# Patient Record
Sex: MALE | Race: ASIAN | NOT HISPANIC OR LATINO
[De-identification: names, ages, dates, MRNs, and addresses within clinical notes are randomized per-mention and may not be internally consistent; named-entity substitution may affect disease eponyms.]

---

## 2017-12-04 ENCOUNTER — APPOINTMENT (OUTPATIENT)
Dept: OTHER | Facility: CLINIC | Age: 47
End: 2017-12-04
Payer: COMMERCIAL

## 2017-12-04 VITALS
HEART RATE: 71 BPM | OXYGEN SATURATION: 98 % | HEIGHT: 68 IN | RESPIRATION RATE: 14 BRPM | SYSTOLIC BLOOD PRESSURE: 124 MMHG | WEIGHT: 199 LBS | BODY MASS INDEX: 30.16 KG/M2 | DIASTOLIC BLOOD PRESSURE: 83 MMHG

## 2017-12-04 PROCEDURE — 94010 BREATHING CAPACITY TEST: CPT

## 2017-12-04 PROCEDURE — 96150: CPT

## 2017-12-04 PROCEDURE — 99396 PREV VISIT EST AGE 40-64: CPT

## 2017-12-05 LAB
ALBUMIN SERPL ELPH-MCNC: 4.5 G/DL
ALP BLD-CCNC: 56 U/L
ALT SERPL-CCNC: 23 U/L
ANION GAP SERPL CALC-SCNC: 19 MMOL/L
APPEARANCE: CLEAR
AST SERPL-CCNC: 25 U/L
BASOPHILS # BLD AUTO: 0.02 K/UL
BASOPHILS NFR BLD AUTO: 0.3 %
BILIRUB SERPL-MCNC: 0.6 MG/DL
BILIRUBIN URINE: NEGATIVE
BLOOD URINE: NEGATIVE
BUN SERPL-MCNC: 14 MG/DL
CALCIUM SERPL-MCNC: 9.5 MG/DL
CHLORIDE SERPL-SCNC: 104 MMOL/L
CHOLEST SERPL-MCNC: 150 MG/DL
CHOLEST/HDLC SERPL: 4.7 RATIO
CO2 SERPL-SCNC: 22 MMOL/L
COLOR: YELLOW
CREAT SERPL-MCNC: 1.02 MG/DL
EOSINOPHIL # BLD AUTO: 0.04 K/UL
EOSINOPHIL NFR BLD AUTO: 0.5 %
GLUCOSE QUALITATIVE U: NEGATIVE MG/DL
GLUCOSE SERPL-MCNC: 97 MG/DL
HCT VFR BLD CALC: 39.2 %
HDLC SERPL-MCNC: 32 MG/DL
HGB BLD-MCNC: 14.2 G/DL
IMM GRANULOCYTES NFR BLD AUTO: 0.3 %
KETONES URINE: NEGATIVE
LDLC SERPL CALC-MCNC: 83 MG/DL
LEUKOCYTE ESTERASE URINE: NEGATIVE
LYMPHOCYTES # BLD AUTO: 2.34 K/UL
LYMPHOCYTES NFR BLD AUTO: 30.7 %
MAN DIFF?: NORMAL
MCHC RBC-ENTMCNC: 32.2 PG
MCHC RBC-ENTMCNC: 36.2 GM/DL
MCV RBC AUTO: 88.9 FL
MONOCYTES # BLD AUTO: 0.56 K/UL
MONOCYTES NFR BLD AUTO: 7.3 %
NEUTROPHILS # BLD AUTO: 4.64 K/UL
NEUTROPHILS NFR BLD AUTO: 60.9 %
NITRITE URINE: NEGATIVE
PH URINE: 6.5
PLATELET # BLD AUTO: 124 K/UL
POTASSIUM SERPL-SCNC: 4.6 MMOL/L
PROT SERPL-MCNC: 7.2 G/DL
PROTEIN URINE: NEGATIVE MG/DL
RBC # BLD: 4.41 M/UL
RBC # FLD: 13.5 %
SODIUM SERPL-SCNC: 145 MMOL/L
SPECIFIC GRAVITY URINE: 1.02
TRIGL SERPL-MCNC: 174 MG/DL
UROBILINOGEN URINE: NEGATIVE MG/DL
WBC # FLD AUTO: 7.62 K/UL

## 2019-03-12 ENCOUNTER — APPOINTMENT (OUTPATIENT)
Dept: OTHER | Facility: CLINIC | Age: 49
End: 2019-03-12
Payer: COMMERCIAL

## 2019-03-12 VITALS
SYSTOLIC BLOOD PRESSURE: 112 MMHG | DIASTOLIC BLOOD PRESSURE: 75 MMHG | RESPIRATION RATE: 16 BRPM | HEIGHT: 68 IN | BODY MASS INDEX: 30.31 KG/M2 | HEART RATE: 63 BPM | OXYGEN SATURATION: 99 % | WEIGHT: 200 LBS

## 2019-03-12 PROCEDURE — 94010 BREATHING CAPACITY TEST: CPT

## 2019-03-12 PROCEDURE — 99396 PREV VISIT EST AGE 40-64: CPT | Mod: 25

## 2019-03-12 PROCEDURE — 96150: CPT

## 2019-03-12 NOTE — DISCUSSION/SUMMARY
[Patient seen for WTC Monitoring ___] : Patient was seen for WTC monitoring [unfilled] [Please See Note in Chart and Documentation in Trial DB] : Please see note in chart and documentation in Trial DB. [FreeTextEntry3] : HPI: 49 yo male here for V7\par \par also snoring, interrupted sleep , EDS \par stated that was diagnosed with CLARA tried to use  CPAP 2 months \par did not like it\par PCP: Dr. 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 Similar to previous\par \par A/P: CBC, CMP, lipids, UA ordered \par  rec to loose weight \par CLARA discussed \par CPAP discussed \par GI referral fro screening colonoscopy

## 2019-03-13 LAB
ALBUMIN SERPL ELPH-MCNC: 4.6 G/DL
ALP BLD-CCNC: 64 U/L
ALT SERPL-CCNC: 37 U/L
ANION GAP SERPL CALC-SCNC: 13 MMOL/L
APPEARANCE: ABNORMAL
AST SERPL-CCNC: 31 U/L
BACTERIA: NEGATIVE
BASOPHILS # BLD AUTO: 0.03 K/UL
BASOPHILS NFR BLD AUTO: 0.5 %
BILIRUB SERPL-MCNC: 0.7 MG/DL
BILIRUBIN URINE: NEGATIVE
BLOOD URINE: NEGATIVE
BUN SERPL-MCNC: 16 MG/DL
CALCIUM SERPL-MCNC: 9.4 MG/DL
CHLORIDE SERPL-SCNC: 108 MMOL/L
CHOLEST SERPL-MCNC: 128 MG/DL
CHOLEST/HDLC SERPL: 4.6 RATIO
CO2 SERPL-SCNC: 24 MMOL/L
COLOR: YELLOW
CREAT SERPL-MCNC: 0.94 MG/DL
EOSINOPHIL # BLD AUTO: 0.11 K/UL
EOSINOPHIL NFR BLD AUTO: 1.8 %
GLUCOSE QUALITATIVE U: NEGATIVE
GLUCOSE SERPL-MCNC: 99 MG/DL
HCT VFR BLD CALC: 41.9 %
HDLC SERPL-MCNC: 28 MG/DL
HGB BLD-MCNC: 14.3 G/DL
HYALINE CASTS: 1 /LPF
IMM GRANULOCYTES NFR BLD AUTO: 0.3 %
KETONES URINE: NEGATIVE
LDLC SERPL CALC-MCNC: 67 MG/DL
LEUKOCYTE ESTERASE URINE: NEGATIVE
LYMPHOCYTES # BLD AUTO: 2.48 K/UL
LYMPHOCYTES NFR BLD AUTO: 41.3 %
MAN DIFF?: NORMAL
MCHC RBC-ENTMCNC: 31.7 PG
MCHC RBC-ENTMCNC: 34.1 GM/DL
MCV RBC AUTO: 92.9 FL
MICROSCOPIC-UA: NORMAL
MONOCYTES # BLD AUTO: 0.59 K/UL
MONOCYTES NFR BLD AUTO: 9.8 %
NEUTROPHILS # BLD AUTO: 2.78 K/UL
NEUTROPHILS NFR BLD AUTO: 46.3 %
NITRITE URINE: NEGATIVE
PH URINE: 6
PLATELET # BLD AUTO: 129 K/UL
POTASSIUM SERPL-SCNC: 4.4 MMOL/L
PROT SERPL-MCNC: 6.9 G/DL
PROTEIN URINE: NEGATIVE
RBC # BLD: 4.51 M/UL
RBC # FLD: 13.1 %
RED BLOOD CELLS URINE: 1 /HPF
SODIUM SERPL-SCNC: 144 MMOL/L
SPECIFIC GRAVITY URINE: 1.02
SQUAMOUS EPITHELIAL CELLS: 0 /HPF
TRIGL SERPL-MCNC: 167 MG/DL
UROBILINOGEN URINE: NORMAL
WBC # FLD AUTO: 6.01 K/UL
WHITE BLOOD CELLS URINE: 0 /HPF

## 2019-08-02 ENCOUNTER — APPOINTMENT (OUTPATIENT)
Dept: GASTROENTEROLOGY | Facility: CLINIC | Age: 49
End: 2019-08-02
Payer: COMMERCIAL

## 2019-08-02 VITALS
RESPIRATION RATE: 16 BRPM | TEMPERATURE: 97.5 F | HEIGHT: 68 IN | DIASTOLIC BLOOD PRESSURE: 62 MMHG | WEIGHT: 185 LBS | SYSTOLIC BLOOD PRESSURE: 120 MMHG | HEART RATE: 48 BPM | OXYGEN SATURATION: 98 % | BODY MASS INDEX: 28.04 KG/M2

## 2019-08-02 DIAGNOSIS — Z87.19 PERSONAL HISTORY OF OTHER DISEASES OF THE DIGESTIVE SYSTEM: ICD-10-CM

## 2019-08-02 DIAGNOSIS — D69.6 THROMBOCYTOPENIA, UNSPECIFIED: ICD-10-CM

## 2019-08-02 DIAGNOSIS — Z12.11 ENCOUNTER FOR SCREENING FOR MALIGNANT NEOPLASM OF COLON: ICD-10-CM

## 2019-08-02 PROCEDURE — 99204 OFFICE O/P NEW MOD 45 MIN: CPT

## 2019-08-02 NOTE — PHYSICAL EXAM
[General Appearance - Alert] : alert [Sclera] : the sclera and conjunctiva were normal [General Appearance - In No Acute Distress] : in no acute distress [PERRL With Normal Accommodation] : pupils were equal in size, round, and reactive to light [Extraocular Movements] : extraocular movements were intact [Outer Ear] : the ears and nose were normal in appearance [Oropharynx] : the oropharynx was normal [Neck Cervical Mass (___cm)] : no neck mass was observed [Neck Appearance] : the appearance of the neck was normal [Jugular Venous Distention Increased] : there was no jugular-venous distention [Thyroid Diffuse Enlargement] : the thyroid was not enlarged [Thyroid Nodule] : there were no palpable thyroid nodules [Auscultation Breath Sounds / Voice Sounds] : lungs were clear to auscultation bilaterally [Heart Rate And Rhythm] : heart rate was normal and rhythm regular [Heart Sounds] : normal S1 and S2 [Murmurs] : no murmurs [Heart Sounds Gallop] : no gallops [Edema] : there was no peripheral edema [Heart Sounds Pericardial Friction Rub] : no pericardial rub [Bowel Sounds] : normal bowel sounds [Abdomen Soft] : soft [Abdomen Tenderness] : non-tender [Cervical Lymph Nodes Enlarged Posterior Bilaterally] : posterior cervical [Abdomen Mass (___ Cm)] : no abdominal mass palpated [Cervical Lymph Nodes Enlarged Anterior Bilaterally] : anterior cervical [Supraclavicular Lymph Nodes Enlarged Bilaterally] : supraclavicular [Axillary Lymph Nodes Enlarged Bilaterally] : axillary [Inguinal Lymph Nodes Enlarged Bilaterally] : inguinal [Femoral Lymph Nodes Enlarged Bilaterally] : femoral [No CVA Tenderness] : no ~M costovertebral angle tenderness [No Spinal Tenderness] : no spinal tenderness [Abnormal Walk] : normal gait [Nail Clubbing] : no clubbing  or cyanosis of the fingernails [Musculoskeletal - Swelling] : no joint swelling seen [Skin Color & Pigmentation] : normal skin color and pigmentation [Motor Tone] : muscle strength and tone were normal [Skin Turgor] : normal skin turgor [Oriented To Time, Place, And Person] : oriented to person, place, and time [Impaired Insight] : insight and judgment were intact [] : no rash [Affect] : the affect was normal

## 2019-08-02 NOTE — ASSESSMENT
[FreeTextEntry1] : 48-year-old male average risk for colon cancer and polyps\par \par Plan\par Patient educated regarding the indications risks benefits and alternatives to colonoscopy as a tool for colon cancer screening and prevention purposes. He is agreeable to examination.\par \par Patient referred by Dr. Natasha Arroyo

## 2019-08-02 NOTE — HISTORY OF PRESENT ILLNESS
[de-identified] : 48-year-old male presents for evaluation prior to screening colonoscopy.\par Denies any history of prior examination\par No family history of colon cancer\par Denies any active bowel complaints.\par Patient did note that recently on "keto" diet he developed constipation, resolved when he resume normal diet\par Also with a history of acid reflux, no longer an issue since weight loss\par Sometimes exacerbated by specific foods such as tomato sauce\par Denies any difficulty swallowing\par \par Denies any history of heart failure coronary artery disease or dysrhythmia\par Sleep apnea noncompliant with CPAP\par \par Social history nonsmoker [employer]

## 2019-09-18 ENCOUNTER — APPOINTMENT (OUTPATIENT)
Dept: GASTROENTEROLOGY | Facility: AMBULATORY MEDICAL SERVICES | Age: 49
End: 2019-09-18
Payer: COMMERCIAL

## 2019-09-18 PROCEDURE — 45378 DIAGNOSTIC COLONOSCOPY: CPT

## 2019-09-24 ENCOUNTER — TRANSCRIPTION ENCOUNTER (OUTPATIENT)
Age: 49
End: 2019-09-24

## 2020-04-08 ENCOUNTER — EMERGENCY (EMERGENCY)
Facility: HOSPITAL | Age: 50
LOS: 1 days | Discharge: HOME | End: 2020-04-08
Attending: EMERGENCY MEDICINE
Payer: COMMERCIAL

## 2020-04-08 VITALS
OXYGEN SATURATION: 93 % | RESPIRATION RATE: 20 BRPM | HEIGHT: 69 IN | DIASTOLIC BLOOD PRESSURE: 61 MMHG | WEIGHT: 186.95 LBS | SYSTOLIC BLOOD PRESSURE: 90 MMHG | HEART RATE: 88 BPM | TEMPERATURE: 103 F

## 2020-04-08 LAB
ALBUMIN SERPL ELPH-MCNC: 3.8 G/DL — SIGNIFICANT CHANGE UP (ref 3.3–5)
ALP SERPL-CCNC: 40 U/L — SIGNIFICANT CHANGE UP (ref 40–120)
ALT FLD-CCNC: 28 U/L — SIGNIFICANT CHANGE UP (ref 10–45)
ANION GAP SERPL CALC-SCNC: 13 MMOL/L — SIGNIFICANT CHANGE UP (ref 5–17)
AST SERPL-CCNC: 48 U/L — HIGH (ref 10–40)
BASE EXCESS BLDV CALC-SCNC: 0.3 MMOL/L — SIGNIFICANT CHANGE UP (ref -2–2)
BILIRUB SERPL-MCNC: 0.9 MG/DL — SIGNIFICANT CHANGE UP (ref 0.2–1.2)
BUN SERPL-MCNC: 16 MG/DL — SIGNIFICANT CHANGE UP (ref 7–23)
CA-I SERPL-SCNC: 1.13 MMOL/L — SIGNIFICANT CHANGE UP (ref 1.12–1.3)
CALCIUM SERPL-MCNC: 8.4 MG/DL — SIGNIFICANT CHANGE UP (ref 8.4–10.5)
CHLORIDE BLDV-SCNC: 100 MMOL/L — SIGNIFICANT CHANGE UP (ref 96–108)
CHLORIDE SERPL-SCNC: 97 MMOL/L — SIGNIFICANT CHANGE UP (ref 96–108)
CO2 BLDV-SCNC: 24 MMOL/L — SIGNIFICANT CHANGE UP (ref 22–30)
CO2 SERPL-SCNC: 21 MMOL/L — LOW (ref 22–31)
CREAT SERPL-MCNC: 1.11 MG/DL — SIGNIFICANT CHANGE UP (ref 0.5–1.3)
D DIMER BLD IA.RAPID-MCNC: 312 NG/ML DDU — HIGH
FIBRINOGEN PPP-MCNC: 618 MG/DL — HIGH (ref 350–510)
GAS PNL BLDV: 131 MMOL/L — LOW (ref 135–145)
GAS PNL BLDV: SIGNIFICANT CHANGE UP
GAS PNL BLDV: SIGNIFICANT CHANGE UP
GLUCOSE BLDV-MCNC: 118 MG/DL — HIGH (ref 70–99)
GLUCOSE SERPL-MCNC: 124 MG/DL — HIGH (ref 70–99)
HCO3 BLDV-SCNC: 23 MMOL/L — SIGNIFICANT CHANGE UP (ref 21–29)
HCT VFR BLDA CALC: 44 % — SIGNIFICANT CHANGE UP (ref 39–50)
HGB BLD CALC-MCNC: 14.3 G/DL — SIGNIFICANT CHANGE UP (ref 13–17)
LACTATE BLDV-MCNC: 1.2 MMOL/L — SIGNIFICANT CHANGE UP (ref 0.7–2)
LDH SERPL L TO P-CCNC: 457 U/L — HIGH (ref 50–242)
OTHER CELLS CSF MANUAL: 14 ML/DL — LOW (ref 18–22)
PCO2 BLDV: 35 MMHG — SIGNIFICANT CHANGE UP (ref 35–50)
PH BLDV: 7.44 — SIGNIFICANT CHANGE UP (ref 7.35–7.45)
PO2 BLDV: 36 MMHG — SIGNIFICANT CHANGE UP (ref 25–45)
POTASSIUM BLDV-SCNC: 3.8 MMOL/L — SIGNIFICANT CHANGE UP (ref 3.5–5.3)
POTASSIUM SERPL-MCNC: 3.9 MMOL/L — SIGNIFICANT CHANGE UP (ref 3.5–5.3)
POTASSIUM SERPL-SCNC: 3.9 MMOL/L — SIGNIFICANT CHANGE UP (ref 3.5–5.3)
PROCALCITONIN SERPL-MCNC: 0.1 NG/ML — SIGNIFICANT CHANGE UP (ref 0.02–0.1)
PROT SERPL-MCNC: 6.9 G/DL — SIGNIFICANT CHANGE UP (ref 6–8.3)
SAO2 % BLDV: 69 % — SIGNIFICANT CHANGE UP (ref 67–88)
SODIUM SERPL-SCNC: 131 MMOL/L — LOW (ref 135–145)
WBC # BLD: 1.8 K/UL — LOW (ref 3.8–10.5)
WBC # FLD AUTO: 1.8 K/UL — LOW (ref 3.8–10.5)

## 2020-04-08 PROCEDURE — 84295 ASSAY OF SERUM SODIUM: CPT

## 2020-04-08 PROCEDURE — 85014 HEMATOCRIT: CPT

## 2020-04-08 PROCEDURE — 84132 ASSAY OF SERUM POTASSIUM: CPT

## 2020-04-08 PROCEDURE — 82435 ASSAY OF BLOOD CHLORIDE: CPT

## 2020-04-08 PROCEDURE — 82565 ASSAY OF CREATININE: CPT

## 2020-04-08 PROCEDURE — 83605 ASSAY OF LACTIC ACID: CPT

## 2020-04-08 PROCEDURE — 82728 ASSAY OF FERRITIN: CPT

## 2020-04-08 PROCEDURE — 85027 COMPLETE CBC AUTOMATED: CPT

## 2020-04-08 PROCEDURE — 71045 X-RAY EXAM CHEST 1 VIEW: CPT | Mod: 26

## 2020-04-08 PROCEDURE — 93005 ELECTROCARDIOGRAM TRACING: CPT

## 2020-04-08 PROCEDURE — 82947 ASSAY GLUCOSE BLOOD QUANT: CPT

## 2020-04-08 PROCEDURE — 85060 BLOOD SMEAR INTERPRETATION: CPT

## 2020-04-08 PROCEDURE — 85384 FIBRINOGEN ACTIVITY: CPT

## 2020-04-08 PROCEDURE — 84145 PROCALCITONIN (PCT): CPT

## 2020-04-08 PROCEDURE — 71045 X-RAY EXAM CHEST 1 VIEW: CPT

## 2020-04-08 PROCEDURE — 85379 FIBRIN DEGRADATION QUANT: CPT

## 2020-04-08 PROCEDURE — 82803 BLOOD GASES ANY COMBINATION: CPT

## 2020-04-08 PROCEDURE — 86140 C-REACTIVE PROTEIN: CPT

## 2020-04-08 PROCEDURE — 82330 ASSAY OF CALCIUM: CPT

## 2020-04-08 PROCEDURE — 99285 EMERGENCY DEPT VISIT HI MDM: CPT

## 2020-04-08 PROCEDURE — 99283 EMERGENCY DEPT VISIT LOW MDM: CPT | Mod: 25

## 2020-04-08 PROCEDURE — 80053 COMPREHEN METABOLIC PANEL: CPT

## 2020-04-08 PROCEDURE — 83615 LACTATE (LD) (LDH) ENZYME: CPT

## 2020-04-08 RX ORDER — ACETAMINOPHEN 500 MG
975 TABLET ORAL ONCE
Refills: 0 | Status: COMPLETED | OUTPATIENT
Start: 2020-04-08 | End: 2020-04-08

## 2020-04-08 RX ADMIN — Medication 975 MILLIGRAM(S): at 23:09

## 2020-04-08 NOTE — ED PROVIDER NOTE - PROGRESS NOTE DETAILS
Ambulatory 02 sat 95%. Removed 2L NC, continues to sat at 95% on RA. Will continue to monitor.   Patricia Gutiérrez D.O. (PGY-1) Discussed strict return precautions, monitoring symptom progression at home, and pulse ox monitoring along with isolation instructions. Addressed all questions and concerns at this time. Pt verbalized understanding. Pt 02 sat 96% on RA. VS otherwise stable. Medically stable for discharge.   Patricia Gutiérrez D.O. (PGY-1)

## 2020-04-08 NOTE — ED PROVIDER NOTE - NSFOLLOWUPINSTRUCTIONS_ED_ALL_ED_FT
You were seen and evaluated in the Emergency Department for your viral upper respiratory-like, possibly COVID. As we are not testing patients for COVID that are stable for discharge, you should self-quarantine for presumed COVID. You can call back for your viral panel results by [phone]; please see the attached COVID information packet for management of your symptoms, and more information on the next steps including your self-quarantine measures. Remaining self-quarantined is crucial to limiting the spread of the virus.  You may also refer to the CDC website for more information: https://www.cdc.gov/coronavirus/2019-ncov/if-you-are-sick/steps-when-sick.html.      At this time your clinical evaluation and history do not demonstrate any acute, life-threatening medical conditions warranting emergent treatment. We strongly recommend you set up a follow up with your primary care doctor or with our Internal Medicine clinic (see contact information below).    [facility] Internal Medicine  General Internal Medicine  [address] [place] [address]  Phone: [phone]    Continue to maintain oral hydration with plenty of fluids.  You may take Tylenol (Acetaminophen) and Motrin (Ibuprofen), every 8 hours with food (following the instructions on the medication insert information sheet for dosing) for fever control.  Do not exceed 3000 mg in 24 hours of acetaminophen (Tylenol).     Should you develop new or worsening symptoms including but not limited to chest pain, shortness of breath, abdominal pain, fevers, vomiting, diarrhea - please return to the ED for immediate evaluation.

## 2020-04-08 NOTE — ED ADULT NURSE NOTE - NSIMPLEMENTINTERV_GEN_ALL_ED
Implemented All Universal Safety Interventions:  [place] to call system. Call bell, personal items and telephone within reach. Instruct patient to call for assistance. Room bathroom lighting operational. Non-slip footwear when patient is off stretcher. Physically safe environment: no spills, clutter or unnecessary equipment. Stretcher in lowest position, wheels locked, appropriate side rails in place.

## 2020-04-08 NOTE — ED PROVIDER NOTE - PHYSICAL EXAMINATION
Physical Exam:  Gen: NAD, non-toxic appearing, awake alert   Head: NCAT  HEENT: PEERL, normal conjunctiva, oral mucosa moist  Neck: No lymphadenopathy   Lung: CTAB, no respiratory distress, no wheezes/rhonchi/rales B/L, speaking in full sentences  CV: RRR, no murmurs, rubs or gallops  Abd: soft, NT, ND, no guarding, no rigidity, no rebound tenderness  MSK: no visible deformities, ROM normal in UE/LE, +mild pain to palpation of LLE, no leg swelling or erythema   Neuro: No focal sensory or motor deficits  Skin: Warm, well perfused, no rash  Psych: normal affect, calm  ~Patricia Gutiérrez D.O. -Resident

## 2020-04-08 NOTE — ED PROVIDER NOTE - CLINICAL SUMMARY MEDICAL DECISION MAKING FREE TEXT BOX
49y M w/ no PMHx presenting with 10d fever, myalgias, diarrhea, dry cough w/ associated sob. Likely COVID etiology, hypoxic to 79 on RA, requiring NC, will ambulate, covid w/u w/ CXR and EKG, likely admission for 02 requirement.

## 2020-04-08 NOTE — ED PROVIDER NOTE - ADDITIONAL NOTES AND INSTRUCTIONS:
Benjamin has been instructed to remain home, not to return to work for two weeks to monitor his symptoms and to return to the Emergency Department for any worsening symptoms.

## 2020-04-08 NOTE — ED PROVIDER NOTE - ATTENDING CONTRIBUTION TO CARE
I performed a history and physical exam of the patient and discussed their management with the resident/ACP. I reviewed the resident/ACP's note and agree with the documented findings and plan of care.   49M no PMHx, COVID like symptoms for 10 days, PE unremarkable, initially was reported to be hypoxic but rechecked multiple times with normal saturation even while ambulating. PE unremarkable except for fever; Impression: COVID, labs, imaging, if no evidence of hypoxia, re-eval and possibly D/C home. At the end of my shift this patient was signed out to the next attending pending all work up given he was seen less than 5 minutes prior to my shift ending.

## 2020-04-08 NOTE — ED PROVIDER NOTE - OBJECTIVE STATEMENT
49y M w/ no PMHx presenting with 10d fever, myalgias, diarrhea, dry cough w/ associated sob. Patient states while exerting himself, he endorses worsening sob as well as post-tussive sharp chest pain. Has been controlling fever and myalgias at home with Tylenol, last dose at 2pm. Also endorses nausea, but tolerating PO.  Patient endorses +COVID contact. Denies headache, vomiting, dysuria, hematuria, rash.

## 2020-04-08 NOTE — ED PROVIDER NOTE - PATIENT PORTAL LINK FT
You can access the FollowMyHealth Patient Portal offered by [facility] by registering at the following website: [email or web address]. By joining [facility]’s FollowMyHealth portal, you will also be able to view your health information using other applications (apps) compatible with our system.

## 2020-04-08 NOTE — ED PROVIDER NOTE - NS ED ROS FT
CONST: + fevers, +myalgias,  no chills  EYES: no pain, no vision changes  ENT: no sore throat, no ear pain, no change in hearing  CV: +post-tussive chest pain, no leg swelling  RESP: + shortness of breath, + cough  ABD: no abdominal pain, + nausea, no vomiting, + diarrhea  : no dysuria, no flank pain, no hematuria  MSK: no back pain, no extremity pain  NEURO: no headache or additional neurologic complaints  HEME: no easy bleeding  SKIN:  no rash

## 2020-04-08 NOTE — ED ADULT TRIAGE NOTE - CHIEF COMPLAINT QUOTE
day 10 fever, cough, diarrhea, ringing in ear, weakness; COVID tested but now results; pt is [employer] with exposure to COVID pt; decreased spo2 noted today

## 2020-04-08 NOTE — ED ADULT NURSE NOTE - OBJECTIVE STATEMENT
49 y.o M A&Ox3 with no sig PMH presents to the ED c.o fevers, chills, SOB and diarrhea. Pt. reports 11 days of fevers/chills. Developed SOB last night. Presents now with worsening SOB. Pt. placed on 2L NC - improved immediately to 96%. Pt. reports multiple episodes of non bloody diarrhea. Denies n/v, and ABD pain. Endorses pos. sick contacts. States he was tested for covid19 on 4/1 but has not received his results. Febrile to 103. Last took Tylenol at 1400. Denies syncopal episodes, recent travel, n/v and urinary symptoms. Safety and comfort maintained. ISO precautions in place.

## 2020-04-09 VITALS
OXYGEN SATURATION: 96 % | DIASTOLIC BLOOD PRESSURE: 56 MMHG | TEMPERATURE: 100 F | HEART RATE: 69 BPM | RESPIRATION RATE: 20 BRPM | SYSTOLIC BLOOD PRESSURE: 100 MMHG

## 2020-04-09 LAB
BASOPHILS # BLD AUTO: 0 K/UL — SIGNIFICANT CHANGE UP (ref 0–0.2)
BASOPHILS NFR BLD AUTO: 0 % — SIGNIFICANT CHANGE UP (ref 0–2)
CRP SERPL-MCNC: 1.88 MG/DL — HIGH (ref 0–0.4)
EOSINOPHIL # BLD AUTO: 0 K/UL — SIGNIFICANT CHANGE UP (ref 0–0.5)
EOSINOPHIL NFR BLD AUTO: 0 % — SIGNIFICANT CHANGE UP (ref 0–6)
FERRITIN SERPL-MCNC: 4998 NG/ML — HIGH (ref 30–400)
HCT VFR BLD CALC: 37.8 % — LOW (ref 39–50)
HGB BLD-MCNC: 13.7 G/DL — SIGNIFICANT CHANGE UP (ref 13–17)
LYMPHOCYTES # BLD AUTO: 0.46 K/UL — LOW (ref 1–3.3)
LYMPHOCYTES # BLD AUTO: 25.7 % — SIGNIFICANT CHANGE UP (ref 13–44)
MCHC RBC-ENTMCNC: 31 PG — SIGNIFICANT CHANGE UP (ref 27–34)
MCHC RBC-ENTMCNC: 36.2 GM/DL — HIGH (ref 32–36)
MCV RBC AUTO: 85.5 FL — SIGNIFICANT CHANGE UP (ref 80–100)
MONOCYTES # BLD AUTO: 0.33 K/UL — SIGNIFICANT CHANGE UP (ref 0–0.9)
MONOCYTES NFR BLD AUTO: 18.6 % — HIGH (ref 2–14)
NEUTROPHILS # BLD AUTO: 0.59 K/UL — LOW (ref 1.8–7.4)
NEUTROPHILS NFR BLD AUTO: 32.7 % — LOW (ref 43–77)
PLATELET # BLD AUTO: 94 K/UL — LOW (ref 150–400)
RBC # BLD: 4.42 M/UL — SIGNIFICANT CHANGE UP (ref 4.2–5.8)
RBC # FLD: 11 % — SIGNIFICANT CHANGE UP (ref 10.3–14.5)

## 2020-04-09 NOTE — ED POST DISCHARGE NOTE - ADDITIONAL DOCUMENTATION
Findings consistent with suspected COVID19 infection.  No need for further patient contact at this time.  -Dane Uribe PA-C Findings consistent with suspected COVID19 infection.  No need for further patient contact at this time.  -Dane Uribe PA-C    Findings of elevated CRP and elevated ferritin consistent with suspected COVID19 infection.  No need for further patient contact at this time. -Ez PATRICK

## 2020-04-09 NOTE — ED POST DISCHARGE NOTE - RESULT SUMMARY
lymphopenia and reactive lymphocytes lymphopenia and reactive lymphocytes, elevated CRP, elevated Ferritin

## 2020-04-20 ENCOUNTER — APPOINTMENT (OUTPATIENT)
Dept: OTHER | Facility: CLINIC | Age: 50
End: 2020-04-20

## 2020-05-07 ENCOUNTER — TRANSCRIPTION ENCOUNTER (OUTPATIENT)
Age: 50
End: 2020-05-07

## 2020-06-05 PROBLEM — Z78.9 OTHER SPECIFIED HEALTH STATUS: Chronic | Status: ACTIVE | Noted: 2020-04-08

## 2020-06-11 ENCOUNTER — APPOINTMENT (OUTPATIENT)
Dept: PULMONOLOGY | Facility: CLINIC | Age: 50
End: 2020-06-11

## 2020-07-30 DIAGNOSIS — Z01.812 ENCOUNTER FOR PREPROCEDURAL LABORATORY EXAMINATION: ICD-10-CM

## 2020-08-12 ENCOUNTER — APPOINTMENT (OUTPATIENT)
Dept: DISASTER EMERGENCY | Facility: CLINIC | Age: 50
End: 2020-08-12

## 2020-08-14 LAB — SARS-COV-2 N GENE NPH QL NAA+PROBE: NOT DETECTED

## 2020-08-17 ENCOUNTER — APPOINTMENT (OUTPATIENT)
Dept: PULMONOLOGY | Facility: CLINIC | Age: 50
End: 2020-08-17
Payer: COMMERCIAL

## 2020-08-17 VITALS
WEIGHT: 190 LBS | DIASTOLIC BLOOD PRESSURE: 70 MMHG | TEMPERATURE: 97.3 F | RESPIRATION RATE: 16 BRPM | HEIGHT: 68 IN | OXYGEN SATURATION: 98 % | HEART RATE: 60 BPM | SYSTOLIC BLOOD PRESSURE: 120 MMHG | BODY MASS INDEX: 28.79 KG/M2

## 2020-08-17 DIAGNOSIS — Z57.9 OCCUPATIONAL EXPOSURE TO UNSPECIFIED RISK FACTOR: ICD-10-CM

## 2020-08-17 DIAGNOSIS — Z82.69 FAMILY HISTORY OF OTHER DISEASES OF THE MUSCULOSKELETAL SYSTEM AND CONNECTIVE TISSUE: ICD-10-CM

## 2020-08-17 DIAGNOSIS — Z83.3 FAMILY HISTORY OF DIABETES MELLITUS: ICD-10-CM

## 2020-08-17 DIAGNOSIS — Z80.41 FAMILY HISTORY OF MALIGNANT NEOPLASM OF OVARY: ICD-10-CM

## 2020-08-17 DIAGNOSIS — Z86.19 PERSONAL HISTORY OF OTHER INFECTIOUS AND PARASITIC DISEASES: ICD-10-CM

## 2020-08-17 DIAGNOSIS — Z78.9 OTHER SPECIFIED HEALTH STATUS: ICD-10-CM

## 2020-08-17 PROCEDURE — 71046 X-RAY EXAM CHEST 2 VIEWS: CPT

## 2020-08-17 PROCEDURE — 94729 DIFFUSING CAPACITY: CPT

## 2020-08-17 PROCEDURE — 94010 BREATHING CAPACITY TEST: CPT

## 2020-08-17 PROCEDURE — 99204 OFFICE O/P NEW MOD 45 MIN: CPT | Mod: 25

## 2020-08-17 PROCEDURE — 94727 GAS DIL/WSHOT DETER LNG VOL: CPT

## 2020-08-17 PROCEDURE — 94618 PULMONARY STRESS TESTING: CPT

## 2020-08-17 SDOH — HEALTH STABILITY - PHYSICAL HEALTH: OCCUPATIONAL EXPOSURE TO UNSPECIFIED RISK FACTOR: Z57.9

## 2020-08-17 NOTE — PROCEDURE
[FreeTextEntry1] : CXR reveals a normal sized heart; old fx rib on the left,  no evidence of infiltrate or effusion\par \par CT (3/2008) impression: 3 MM NODULE in right middle lobe \par \par \par Full PFT revealed normal flows, with a FEV1 of 3.93L, which is 111% of predicted, normal volumes, and a diffusion of  25.6, which is 100% of predicted, with a normal flow volume loop\par \par 6 minute walk test reveals a low saturation of  98% with no evidence of dyspnea or fatigue; 586.8 walked  meters \par

## 2020-08-17 NOTE — PHYSICAL EXAM
[No Acute Distress] : no acute distress [Normal Oropharynx] : normal oropharynx [No Neck Mass] : no neck mass [Normal Appearance] : normal appearance [III] : Mallampati Class: III [Normal Rate/Rhythm] : normal rate/rhythm [Normal S1, S2] : normal s1, s2 [No Murmurs] : no murmurs [No Abnormalities] : no abnormalities [No Resp Distress] : no resp distress [Clear to Auscultation Bilaterally] : clear to auscultation bilaterally [Normal Gait] : normal gait [Benign] : benign [No Clubbing] : no clubbing [No Edema] : no edema [No Cyanosis] : no cyanosis [FROM] : FROM [No Focal Deficits] : no focal deficits [Normal Color/ Pigmentation] : normal color/ pigmentation [Normal Affect] : normal affect [Oriented x3] : oriented x3 [TextBox_68] : I:E ratio 1:3; clear

## 2020-08-17 NOTE — ASSESSMENT
[FreeTextEntry1] : Mr. GALLOWAY is a 49 year old male with a history of   s/p occupational exposure in work place s/p 9/11, thrombocytopenia, GERD, CLARA  who comes into the office today for pulmonary evaluation for SOB, CLARA, abnormal CT, occupational exposure in work place, s/p COVID-19 pneumonitis / respiratory failure \par \par \par The patient's shortness of breath is multifactorial due to:\par -pulmonary disease \par     - s/p COVID-19 pneumonitis\par     - RADS\par     - ?pleuropericarditis \par -poor breathing mechanics \par -overweight/out of shape\par -?cardiac disease \par \par \par \par Problem 1:  s/p COVID-19 pneumonitis\par - Get blood work: D- Dimer, ferritin level\par \par  COVID-19 Immune Support Recommendations:\par -OTC Vitamin C 500mg BID \par -OTC Quercetin 250-500mg BID \par -OTC Zinc 75-100mg per day \par -OTC Melatonin 1 or 2 mg a night \par -OTC Vitamin D 1-4000mg per day \par -OTC Tonic Water 8oz per day \par \par \par Problem 2: RADS \par - Add Symbicort (160) 2 inhalations BID\par -Add Ventolin rescue inhaler 2 inhalations before exercise, Q6H \par - get full PFTs \par -Inhaler technique reviewed as well as oral hygiene techniques reviewed with patient. Avoidance of cold air, extremes of temperature, rescue inhaler should be used before exercise. Order of medication reviewed with patient. Recommended use of a cool mist humidifier in the bedroom.\par \par  \par Problem 3: ?Pleuropericarditis\par - get work work: CRP and ESR \par \par \par Problem 4:   Occupational exposure at workplace s/p 9/11: \par - risk of asthma c/w COVID-19 pneumonitis\par - get regular follow ups \par \par \par \par Problem 5:GERD\par -Add Pepcid 40 mg QHS\par -Rule of 2s: avoid eating too much, eating too fast, eating too late, eating too spicy, eating too lousy, eating two hours before bed.\par -Things to avoid including overeating, spicy foods, tight clothing, eating within three hours of bed, this list is not all inclusive. \par -For treatment of reflux, possible options discussed including diet control, H2 blockers, PPIs, as well as coating motility agents discussed as treatment options. Timing of meals and proximity of last meal to sleep were discussed. If symptoms persist, a formal gastrointestinal evaluation is needed.\par \par \par  Problem 6: CLARA (neck size: 17, memory / concentration, metabolism)\par - get home sleep study to re-evaluate \par - recommended oral appliance \par Sleep apnea is associated with adverse clinical consequences which an affect most organ systems. Cardiovascular disease risk includes arrhythmias, atrial fibrillation, hypertension, coronary artery disease, and stroke. Metabolic disorders include diabetes type 2, non-alcoholic fatty liver disease. Mood disorder especially depression; and cognitive decline especially in the elderly. Associations with chronic reflux/Garcia’s esophagus some but not all inclusive. \par -Reasons include arousal consistent with hypopnea; respiratory events most prominent in REM sleep or supine position; therefore sleep staging and body position are important for accurate diagnosis and estimation of AHI.\par \par \par Problem 7: abnormal CT (3 mm nodule 3/2008) \par - get follow up CT scan\par CAT scans are the only radiological modality to identify abnormalities w/in the lungs with regards to nodules/masses/lymph nodes. Risks, benefits were reviewed in detail. The guidelines for abnormalities include follow up CT scans at various intervals which could range from 6 weeks to 1 year intervals. If there is a change for the worse then consideration for a biopsy will be considered if you are a candidate. Second opinion evaluation with a thoracic surgeon or an interventional radiologist could be offered. \par \par \par Problem 8 : Poor Mechanics of Breathing\par - Proper breathing techniques were reviewed with an emphasis of exhalation. Patient instructed to breath in for 1 second and out for four seconds. Patient was encouraged to not talk while walking. \par \par Problem 9 : Out of shape \par -Weight loss, exercise, and diet control were discussed and are highly encouraged. Treatment options were given such as, aqua therapy, and contacting a nutritionist. Recommended to use the elliptical, stationary bike, less use of treadmill. Mindful eating was explained to the patient Obesity is associated with worsening asthma, shortness of breath, and potential for cardiac disease, diabetes, and other underlying medical conditions. \par \par Problem 10 : Cardiac\par -recommended to follow up with a cardiologist\par \par Problem 11: health maintenance\par -s/p influenza vaccine\par -recommended strep pneumonia vaccines after age 65: Prevnar-13 vaccine, followed by Pneumo vaccine 23 one year following\par -recommended early intervention for URIs\par -recommended regular osteoporosis evaluations\par -recommended early dermatological evaluations\par -recommended after the age of 50 to the age of 70, colonoscopy every 5 years \par \par  Follow up in 6-8 weeks\par -he  is recommended to call with any changes, questions, or concerns.\par

## 2020-08-17 NOTE — REASON FOR VISIT
[Initial] : an initial visit [TextBox_44] : abnormal CT, s/p COVID-19 pneumonitis, CLARA, GERD, ?pleuropericarditis

## 2020-08-17 NOTE — ADDENDUM
[FreeTextEntry1] : Documented by Marlyn Fallon acting as a scribe for Dr. Sushant Samayoa on 08/17/2020.\par \par All medical record entries made by the Scribe were at my, Dr. Sushant Samayoa's, direction and personally dictated by me on 08/17/2020. I have reviewed the chart and agree that the record accurately reflects my personal performance of the history, physical exam, assessment and plan. I have also personally directed, reviewed, and agree with the discharge instructions.

## 2020-08-17 NOTE — HISTORY OF PRESENT ILLNESS
[TextBox_4] : Mr. GALLOWAY is a 49 year old male presenting to the office today for initial pulmonary evaluation. His chief complaint is\par - he was down at 9/11 for about 6 months. \par - he recently has not been feeling his usual self. 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 He's not the type of person to get sick get the flu or allergies. But now he keeps producing phlegm and it tastes like salt water. He notes he had COVID. He was diagnosed April 1st. He went to [facility], told him he had COVID induced pneumonitis. \par - since COVID he has been feeling different, he does not feel like he gets his full capacity of breathing. \par - he was in the hospital for 4 days, he was given Zithromax at home, as well as Plaquenil. He completed the tx at the hospital. He was not given a biologic. \par - his symptoms were SOB, cough, fever, nausea, diarrhea, and nerve / hand pain. \par - his sense of smell and taste is fine \par - he did not have any significant dreams\par - he lost about 25 lbs and he gained it back. \par - he gets some pain / tightness in his chest once in awhile\par - heart burn / reflux depends on his diet \par - he notes when he's about to fall asleep he feels like he needs to breathe out instead of breathing in\par - could fall asleep while watching a boring TV show \par - could fall asleep in a car\par - his memory / concentration in the mornings is well \par - he is up at least once to urinate a night \par - he was diagnosed with CLARA in 2009. \par - he notes putting the oxygen tank on he feels a bit better. He feels it may be psychological. \par -he denies any headaches, nausea, vomiting, fever, chills, sweats, chest pain, chest pressure, diarrhea, constipation, dysphagia, dizziness, leg swelling, leg pain, itchy eyes, itchy ears, heartburn, reflux, sour taste in the mouth, myalgias or arthralgias

## 2020-10-05 ENCOUNTER — APPOINTMENT (OUTPATIENT)
Dept: PULMONOLOGY | Facility: CLINIC | Age: 50
End: 2020-10-05

## 2020-10-28 ENCOUNTER — APPOINTMENT (OUTPATIENT)
Dept: CT IMAGING | Facility: CLINIC | Age: 50
End: 2020-10-28

## 2020-10-29 ENCOUNTER — APPOINTMENT (OUTPATIENT)
Dept: OTHER | Facility: CLINIC | Age: 50
End: 2020-10-29
Payer: COMMERCIAL

## 2020-10-29 VITALS
RESPIRATION RATE: 16 BRPM | HEART RATE: 56 BPM | BODY MASS INDEX: 29.7 KG/M2 | TEMPERATURE: 97.2 F | SYSTOLIC BLOOD PRESSURE: 123 MMHG | OXYGEN SATURATION: 96 % | DIASTOLIC BLOOD PRESSURE: 83 MMHG | HEIGHT: 68 IN | WEIGHT: 196 LBS

## 2020-10-29 DIAGNOSIS — Z23 ENCOUNTER FOR IMMUNIZATION: ICD-10-CM

## 2020-10-29 DIAGNOSIS — Z04.9 ENCOUNTER FOR EXAMINATION AND OBSERVATION FOR UNSPECIFIED REASON: ICD-10-CM

## 2020-10-29 PROCEDURE — 99396 PREV VISIT EST AGE 40-64: CPT | Mod: 25

## 2020-10-29 PROCEDURE — 90686 IIV4 VACC NO PRSV 0.5 ML IM: CPT

## 2020-10-29 PROCEDURE — G0008: CPT

## 2020-10-29 RX ORDER — SODIUM SULFATE, POTASSIUM SULFATE, MAGNESIUM SULFATE 17.5; 3.13; 1.6 G/ML; G/ML; G/ML
17.5-3.13-1.6 SOLUTION, CONCENTRATE ORAL
Qty: 1 | Refills: 0 | Status: DISCONTINUED | COMMUNITY
Start: 2019-08-02 | End: 2020-10-29

## 2020-10-29 RX ORDER — FAMOTIDINE 40 MG/1
40 TABLET, FILM COATED ORAL
Qty: 90 | Refills: 1 | Status: DISCONTINUED | COMMUNITY
Start: 2020-08-17 | End: 2020-10-29

## 2020-10-29 RX ORDER — BUDESONIDE AND FORMOTEROL FUMARATE DIHYDRATE 160; 4.5 UG/1; UG/1
160-4.5 AEROSOL RESPIRATORY (INHALATION) TWICE DAILY
Qty: 3 | Refills: 2 | Status: DISCONTINUED | COMMUNITY
Start: 2020-08-17 | End: 2020-10-29

## 2020-10-29 RX ORDER — ALBUTEROL SULFATE 90 UG/1
108 (90 BASE) AEROSOL, METERED RESPIRATORY (INHALATION) EVERY 6 HOURS
Qty: 1 | Refills: 2 | Status: DISCONTINUED | COMMUNITY
Start: 2020-08-17 | End: 2020-10-29

## 2020-10-29 NOTE — HEALTH RISK ASSESSMENT
[Patient reported colonoscopy was normal] : Patient reported colonoscopy was normal [ColonoscopyDate] : 2019

## 2020-10-29 NOTE — DISCUSSION/SUMMARY
[Patient seen for WTC Monitoring ___] : Patient was seen for WTC monitoring [unfilled] [Please See Note in Chart and Documentation in Trial DB] : Please see note in chart and documentation in Trial DB. [FreeTextEntry3] : HPI: 50 yo male\par \par  had SARS CoV-2 n [employer] 2020\par was  hospitalized \par back to [employer] at this time \par afterwards had fatigue and SOB but much better now \par PCP: Dr. 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 will refer \par \par \par A/P: WTC MV\par CBC, CMP, lipids, UA ordered \par  CXR ordered \par  influenza vaccination ordered at patients request \par

## 2020-10-30 LAB
ALBUMIN SERPL ELPH-MCNC: 4.8 G/DL
ALP BLD-CCNC: 62 U/L
ALT SERPL-CCNC: 19 U/L
ANION GAP SERPL CALC-SCNC: 11 MMOL/L
APPEARANCE: CLEAR
AST SERPL-CCNC: 19 U/L
BACTERIA: NEGATIVE
BASOPHILS # BLD AUTO: 0.03 K/UL
BASOPHILS NFR BLD AUTO: 0.5 %
BILIRUB SERPL-MCNC: 1 MG/DL
BILIRUBIN URINE: NEGATIVE
BLOOD URINE: NEGATIVE
BUN SERPL-MCNC: 14 MG/DL
CALCIUM SERPL-MCNC: 9.8 MG/DL
CHLORIDE SERPL-SCNC: 107 MMOL/L
CHOLEST SERPL-MCNC: 155 MG/DL
CO2 SERPL-SCNC: 25 MMOL/L
COLOR: YELLOW
CREAT SERPL-MCNC: 1.03 MG/DL
EOSINOPHIL # BLD AUTO: 0.08 K/UL
EOSINOPHIL NFR BLD AUTO: 1.4 %
GLUCOSE QUALITATIVE U: NEGATIVE
GLUCOSE SERPL-MCNC: 96 MG/DL
HCT VFR BLD CALC: 44 %
HDLC SERPL-MCNC: 31 MG/DL
HGB BLD-MCNC: 15 G/DL
HYALINE CASTS: 0 /LPF
IMM GRANULOCYTES NFR BLD AUTO: 0.4 %
KETONES URINE: NEGATIVE
LDLC SERPL CALC-MCNC: 100 MG/DL
LEUKOCYTE ESTERASE URINE: NEGATIVE
LYMPHOCYTES # BLD AUTO: 2.47 K/UL
LYMPHOCYTES NFR BLD AUTO: 43.3 %
MAN DIFF?: NORMAL
MCHC RBC-ENTMCNC: 31.6 PG
MCHC RBC-ENTMCNC: 34.1 GM/DL
MCV RBC AUTO: 92.6 FL
MICROSCOPIC-UA: NORMAL
MONOCYTES # BLD AUTO: 0.57 K/UL
MONOCYTES NFR BLD AUTO: 10 %
NEUTROPHILS # BLD AUTO: 2.54 K/UL
NEUTROPHILS NFR BLD AUTO: 44.4 %
NITRITE URINE: NEGATIVE
NONHDLC SERPL-MCNC: 124 MG/DL
PH URINE: 6
PLATELET # BLD AUTO: 150 K/UL
POTASSIUM SERPL-SCNC: 5.1 MMOL/L
PROT SERPL-MCNC: 7.1 G/DL
PROTEIN URINE: NEGATIVE
RBC # BLD: 4.75 M/UL
RBC # FLD: 12.7 %
RED BLOOD CELLS URINE: 1 /HPF
SODIUM SERPL-SCNC: 143 MMOL/L
SPECIFIC GRAVITY URINE: 1.02
SQUAMOUS EPITHELIAL CELLS: 0 /HPF
TRIGL SERPL-MCNC: 121 MG/DL
UROBILINOGEN URINE: NORMAL
WBC # FLD AUTO: 5.71 K/UL
WHITE BLOOD CELLS URINE: 0 /HPF

## 2020-11-02 ENCOUNTER — NON-APPOINTMENT (OUTPATIENT)
Age: 50
End: 2020-11-02

## 2020-11-02 ENCOUNTER — APPOINTMENT (OUTPATIENT)
Dept: CT IMAGING | Facility: IMAGING CENTER | Age: 50
End: 2020-11-02
Payer: COMMERCIAL

## 2020-11-02 ENCOUNTER — OUTPATIENT (OUTPATIENT)
Dept: OUTPATIENT SERVICES | Facility: HOSPITAL | Age: 50
LOS: 1 days | End: 2020-11-02
Payer: COMMERCIAL

## 2020-11-02 DIAGNOSIS — J45.909 UNSPECIFIED ASTHMA, UNCOMPLICATED: ICD-10-CM

## 2020-11-02 PROCEDURE — 71250 CT THORAX DX C-: CPT | Mod: 26

## 2020-11-02 PROCEDURE — 71250 CT THORAX DX C-: CPT

## 2020-11-18 ENCOUNTER — APPOINTMENT (OUTPATIENT)
Dept: PULMONOLOGY | Facility: CLINIC | Age: 50
End: 2020-11-18
Payer: COMMERCIAL

## 2020-11-18 VITALS
SYSTOLIC BLOOD PRESSURE: 110 MMHG | HEIGHT: 68 IN | BODY MASS INDEX: 30.31 KG/M2 | TEMPERATURE: 97.8 F | DIASTOLIC BLOOD PRESSURE: 80 MMHG | RESPIRATION RATE: 16 BRPM | WEIGHT: 200 LBS | OXYGEN SATURATION: 98 % | HEART RATE: 67 BPM

## 2020-11-18 PROCEDURE — 99214 OFFICE O/P EST MOD 30 MIN: CPT

## 2020-11-18 NOTE — HISTORY OF PRESENT ILLNESS
[TextBox_4] : Mr. GALLOWAY is a 49 year old male presenting to the office today for follow up pulmonary evaluation. His chief complaint is\par - he has been feeling better, close to back to himself\par - he notes he had a headache this morning, but normally he never has headaches\par - he notes just last week he fell asleep while driving\par - no nightmares \par - his wife notes she hears him yelling out in his sleep, probably due to his dreams\par - he occasionally has sob when he goes up the stairs, has palpitations when going up stairs too\par - bowels are regular\par - muscle aches / joint aches once in awhile in his knees or ankles \par - he used to commute to work by biking and ran 15 miles a week but he has not been doing that. \par - he has gained some weight since the last visit \par - his memory has not been as well as it was, his mind feels a little foggy as well \par - He  denies any visual issues, headaches, nausea, vomiting, fever, chills, sweats, chest pains, chest pressure, diarrhea, constipation, dysphagia, myalgia, dizziness, leg swelling, leg pain, itchy eyes, itchy ears, heartburn, reflux, or sour taste in the mouth.\par

## 2020-11-18 NOTE — PROCEDURE
[FreeTextEntry1] : CT (NOV.2.2020) IMPRESSION: minimal peripheral ground-glass opacities and bronchiectasis consistent with sequelae of COVID-19 pneumonia. 2 left upper lobe pulmonary nodules the larger of which measures 3 mm. Follow up with low dose noncontrast chest CT in 12 months. \par \par \par FULL PFTs (Aug.17.2020) reveals normal flows; FEV1 was  3.93L which is   111% of predicted; normal lung volumes; normal diffusion of  25.6, which is  100% of predicted; normal flow volume loop\par \par Sleep study (2018) revealed sleep apnea with an AHI/ALINE of 11-25, c/w with moderate sleep apnea.

## 2020-11-18 NOTE — ASSESSMENT
[FreeTextEntry1] : Mr. GALLOWAY is a 49 year old male with a history of   s/p occupational exposure in work place s/p 9/11, thrombocytopenia, GERD, CLARA  who comes into the office today for pulmonary evaluation for SOB, CLARA, abnormal CT, occupational exposure in work place, s/p COVID-19 pneumonitis / respiratory failure (resolved) - [facility] rx need\par \par \par The patient's shortness of breath is multifactorial due to:\par -pulmonary disease \par     - s/p COVID-19 pneumonitis\par     - RADS\par     - ?pleuropericarditis \par -poor breathing mechanics \par -overweight/out of shape\par -?cardiac disease \par \par \par \par Problem 1:  s/p COVID-19 pneumonitis - resolved \par - Get blood work: D- Dimer, ferritin level\par \par  COVID-19 Immune Support Recommendations:\par -OTC Vitamin C 500mg BID \par -OTC Quercetin 250-500mg BID \par -OTC Zinc 75-100mg per day \par -OTC Melatonin 1 or 2 mg a night \par -OTC Vitamin D 1-4000mg per day \par -OTC Tonic Water 8oz per day \par \par \par Problem 2: RADS (quiet)\par - continue Symbicort (160) 2 inhalations BID\par -continue Ventolin rescue inhaler 2 inhalations before exercise, Q6H \par - s/p full PFTs - wnl \par -Inhaler technique reviewed as well as oral hygiene techniques reviewed with patient. Avoidance of cold air, extremes of temperature, rescue inhaler should be used before exercise. Order of medication reviewed with patient. Recommended use of a cool mist humidifier in the bedroom.\par \par  \par Problem 3: ?Pleuropericarditis (resolved)\par - s/p blood work: CRP and ESR \par \par \par Problem 4:   Occupational exposure at workplace s/p 9/11: \par - risk of asthma c/w COVID-19 pneumonitis\par - get regular follow ups \par \par \par Problem 5:GERD\par -Add Pepcid 40 mg QHS\par -Rule of 2s: avoid eating too much, eating too fast, eating too late, eating too spicy, eating too lousy, eating two hours before bed.\par -Things to avoid including overeating, spicy foods, tight clothing, eating within three hours of bed, this list is not all inclusive. \par -For treatment of reflux, possible options discussed including diet control, H2 blockers, PPIs, as well as coating motility agents discussed as treatment options. Timing of meals and proximity of last meal to sleep were discussed. If symptoms persist, a formal gastrointestinal evaluation is needed.\par \par \par  Problem 6: CLARA (neck size: 17, memory / concentration, metabolism)\par - s/p home sleep study to re-evaluate - c/w moderate sleep apnea \par - get APAP study \par - recommended oral appliance (Jami Fan) \par Sleep apnea is associated with adverse clinical consequences which an affect most organ systems. Cardiovascular disease risk includes arrhythmias, atrial fibrillation, hypertension, coronary artery disease, and stroke. Metabolic disorders include diabetes type 2, non-alcoholic fatty liver disease. Mood disorder especially depression; and cognitive decline especially in the elderly. Associations with chronic reflux/Garcia’s esophagus some but not all inclusive. \par -Reasons include arousal consistent with hypopnea; respiratory events most prominent in REM sleep or supine position; therefore sleep staging and body position are important for accurate diagnosis and estimation of AHI.\par \par \par Problem 7: abnormal CT (3 mm nodule 3/2008) \par - s/p follow up CT scan\par CAT scans are the only radiological modality to identify abnormalities w/in the lungs with regards to nodules/masses/lymph nodes. Risks, benefits were reviewed in detail. The guidelines for abnormalities include follow up CT scans at various intervals which could range from 6 weeks to 1 year intervals. If there is a change for the worse then consideration for a biopsy will be considered if you are a candidate. Second opinion evaluation with a thoracic surgeon or an interventional radiologist could be offered. \par \par \par Problem 8 : Poor Mechanics of Breathing\par - Proper breathing techniques were reviewed with an emphasis of exhalation. Patient instructed to breath in for 1 second and out for four seconds. Patient was encouraged to not talk while walking. \par \par Problem 9 : Out of shape \par -Weight loss, exercise, and diet control were discussed and are highly encouraged. Treatment options were given such as, aqua therapy, and contacting a nutritionist. Recommended to use the elliptical, stationary bike, less use of treadmill. Mindful eating was explained to the patient Obesity is associated with worsening asthma, shortness of breath, and potential for cardiac disease, diabetes, and other underlying medical conditions. \par \par Problem 10 : Cardiac\par -recommended to follow up with a cardiologist\par \par Problem 11: health maintenance\par -s/p influenza vaccine\par -recommended strep pneumonia vaccines after age 65: Prevnar-13 vaccine, followed by Pneumo vaccine 23 one year following\par -recommended early intervention for URIs\par -recommended regular osteoporosis evaluations\par -recommended early dermatological evaluations\par -recommended after the age of 50 to the age of 70, colonoscopy every 5 years \par \par  Follow up in 6-8 weeks\par -he  is recommended to call with any changes, questions, or concerns.\par

## 2020-11-18 NOTE — ADDENDUM
[FreeTextEntry1] : Documented by Marlyn Fallon acting as a scribe for Dr. Sushant Samayoa on 11/18/2020 \par \par All medical record entries made by the Scribe were at my, Dr. Sushant Samayoa's, direction and personally dictated by me on 11/18/2020 . I have reviewed the chart and agree that the record accurately reflects my personal performance of the history, physical exam, assessment and plan. I have also personally directed, reviewed, and agree with the discharge instructions.

## 2020-11-18 NOTE — REASON FOR VISIT
[Follow-Up] : a follow-up visit [TextBox_44] : abnormal CT, s/p COVID-19 pneumonitis, CLARA, GERD, ?pleuropericarditis

## 2020-12-21 PROBLEM — Z12.11 ENCOUNTER FOR SCREENING COLONOSCOPY: Status: RESOLVED | Noted: 2019-08-02 | Resolved: 2020-12-21

## 2021-03-24 ENCOUNTER — APPOINTMENT (OUTPATIENT)
Dept: PULMONOLOGY | Facility: CLINIC | Age: 51
End: 2021-03-24
Payer: COMMERCIAL

## 2021-03-24 VITALS
RESPIRATION RATE: 16 BRPM | SYSTOLIC BLOOD PRESSURE: 120 MMHG | BODY MASS INDEX: 31.52 KG/M2 | HEART RATE: 80 BPM | WEIGHT: 208 LBS | OXYGEN SATURATION: 98 % | HEIGHT: 68 IN | TEMPERATURE: 97.5 F | DIASTOLIC BLOOD PRESSURE: 82 MMHG

## 2021-03-24 PROCEDURE — 99214 OFFICE O/P EST MOD 30 MIN: CPT

## 2021-03-24 PROCEDURE — 99072 ADDL SUPL MATRL&STAF TM PHE: CPT

## 2021-03-24 PROCEDURE — ZZZZZ: CPT

## 2021-03-24 NOTE — REASON FOR VISIT
[Follow-Up] : a follow-up visit [TextBox_44] : abnormal CT, s/p COVID-19 pneumonitis, CLARA, GERD, ?pleuropericarditis , RADS

## 2021-03-24 NOTE — ADDENDUM
[FreeTextEntry1] : Documented by Brad Baca acting as a scribe for Dr. Sushant Samayoa on 03/24/2021.\par \par All medical record entries made by the Scribe were at my, Dr. Sushant Samayoa's, direction and personally dictated by me on 03/24/2021 . I have reviewed the chart and agree that the record accurately reflects my personal performance of the history, physical exam, assessment and plan. I have also personally directed, reviewed, and agree with the discharge instructions. \par

## 2021-03-24 NOTE — PROCEDURE
[FreeTextEntry1] : CT Chest (11.2.2020) reveals minimal peripheral ground-glass opacities and bronchiectasis consistent with the sequelae of COVID 19 pneumonitis. 2 left upper lobe pulmonary nodules the large of which measures 3 mm.. Follow up with low dose noncontrast chest CT in 12 months. \par \par \par -Images and procedures reviewed in detail and discussed with patient.

## 2021-03-24 NOTE — ASSESSMENT
[FreeTextEntry1] : Mr. GALLOWAY is a 50 year old male with a history of   s/p occupational exposure in work place s/p 9/11, thrombocytopenia, GERD, CLARA  who comes into the office today for pulmonary evaluation for SOB, CLARA, abnormal CT, occupational exposure in work place, s/p COVID-19 pneumonitis / respiratory failure (resolved) - [facility] rx need\par \par \par The patient's shortness of breath is multifactorial due to:\par -pulmonary disease \par     - s/p COVID-19 pneumonitis\par     - RADS\par     - ?pleuropericarditis \par -poor breathing mechanics \par -overweight/out of shape\par -?cardiac disease \par \par Problem 1:  s/p COVID-19 pneumonitis - resolved \par - Get blood work: D- Dimer, ferritin level (NC) \par -s/p COVID 19 Vaccine Moderna x 2\par  COVID-19 Immune Support Recommendations:\par -OTC Vitamin C 500mg BID \par -OTC Quercetin 250-500mg BID \par -OTC Zinc 75-100mg per day \par -OTC Melatonin 1 or 2 mg a night \par -OTC Vitamin D 1-4000mg per day \par -OTC Tonic Water 8oz per day \par -recommended  mg \par \par Problem 2: RADS (quiet) (Noncompliant) \par - continue Symbicort (160) 2 inhalations BID\par -continue Ventolin rescue inhaler 2 inhalations before exercise, Q6H \par - s/p full PFTs - wnl \par -Inhaler technique reviewed as well as oral hygiene techniques reviewed with patient. Avoidance of cold air, extremes of temperature, rescue inhaler should be used before exercise. Order of medication reviewed with patient. Recommended use of a cool mist humidifier in the bedroom.\par \par Problem 3: ?Pleuropericarditis (resolved)\par - s/p blood work: CRP and ESR \par \par Problem 4:   Occupational exposure at workplace s/p 9/11: \par - risk of asthma c/w COVID-19 pneumonitis\par - get regular follow ups \par \par Problem 5:GERD\par -continue Pepcid 40 mg QHS\par -Rule of 2s: avoid eating too much, eating too fast, eating too late, eating too spicy, eating too lousy, eating two hours before bed.\par -Things to avoid including overeating, spicy foods, tight clothing, eating within three hours of bed, this list is not all inclusive. \par -For treatment of reflux, possible options discussed including diet control, H2 blockers, PPIs, as well as coating motility agents discussed as treatment options. Timing of meals and proximity of last meal to sleep were discussed. If symptoms persist, a formal gastrointestinal evaluation is needed.\par \par  Problem 6: CLARA (neck size: 17, memory / concentration, metabolism)\par - s/p home sleep study to re-evaluate - c/w moderate sleep apnea \par - get APAP study (refused) \par - recommended oral appliance (Jami Fan) \par Sleep apnea is associated with adverse clinical consequences which an affect most organ systems. Cardiovascular disease risk includes arrhythmias, atrial fibrillation, hypertension, coronary artery disease, and stroke. Metabolic disorders include diabetes type 2, non-alcoholic fatty liver disease. Mood disorder especially depression; and cognitive decline especially in the elderly. Associations with chronic reflux/Garcia’s esophagus some but not all inclusive. \par -Reasons include arousal consistent with hypopnea; respiratory events most prominent in REM sleep or supine position; therefore sleep staging and body position are important for accurate diagnosis and estimation of AHI.\par \par Problem 7: abnormal CT (3 mm nodule 3/2008) -C/w prior COVID \par - s/p follow up CT scan (last 11/2020, next 4/2021) \par CAT scans are the only radiological modality to identify abnormalities w/in the lungs with regards to nodules/masses/lymph nodes. Risks, benefits were reviewed in detail. The guidelines for abnormalities include follow up CT scans at various intervals which could range from 6 weeks to 1 year intervals. If there is a change for the worse then consideration for a biopsy will be considered if you are a candidate. Second opinion evaluation with a thoracic surgeon or an interventional radiologist could be offered. \par \par Problem 8 : Poor Mechanics of Breathing\par - Proper breathing techniques were reviewed with an emphasis of exhalation. Patient instructed to breath in for 1 second and out for four seconds. Patient was encouraged to not talk while walking. \par \par Problem 9 : Out of shape \par -Weight loss, exercise, and diet control were discussed and are highly encouraged. Treatment options were given such as, aqua therapy, and contacting a nutritionist. Recommended to use the elliptical, stationary bike, less use of treadmill. Mindful eating was explained to the patient Obesity is associated with worsening asthma, shortness of breath, and potential for cardiac disease, diabetes, and other underlying medical conditions. \par \par Problem 10 : Cardiac\par -recommended to follow up with a cardiologist\par \par Problem 11: health maintenance\par -s/p influenza vaccine\par -recommended strep pneumonia vaccines after age 65: Prevnar-13 vaccine, followed by Pneumo vaccine 23 one year following\par -recommended early intervention for URIs\par -recommended regular osteoporosis evaluations\par -recommended early dermatological evaluations\par -recommended after the age of 50 to the age of 70, colonoscopy every 5 years \par \par  Follow up in 6-8 weeks\par -he  is recommended to call with any changes, questions, or concerns.\par

## 2021-03-24 NOTE — HISTORY OF PRESENT ILLNESS
[TextBox_4] : Mr. GALLOWAY is a 50 year old male presenting to the office today for follow up pulmonary evaluation. His chief complaint is\par -he notes generally feeling improved\par -he notes returned to exercise\par -he notes intermittent fatigue\par -he notes intermittent SOB exacerbated by stairs\par -he notes regular bowel movements \par -he notes intermittent severe heartburn that can last all day\par -he notes taste not yet returned to baseline\par -he notes sleep quality poor\par -he notes throat tightness and gasping sensation while supine prior to sleep\par -he notes weight stable, but higher than baseline\par -he denies wheeze\par -he denies cough\par -\par \par \par -denies any headaches, nausea, vomiting, fever, chills, sweats, chest pain, chest pressure, diarrhea, constipation, dysphagia, sour taste in the mouth, dizziness, leg swelling, leg pain, myalgias, arthralgias, itchy eyes, itchy ears, or reflux.\par \par

## 2021-07-28 ENCOUNTER — APPOINTMENT (OUTPATIENT)
Dept: PULMONOLOGY | Facility: CLINIC | Age: 51
End: 2021-07-28
Payer: COMMERCIAL

## 2021-07-28 VITALS
HEART RATE: 60 BPM | RESPIRATION RATE: 16 BRPM | OXYGEN SATURATION: 99 % | DIASTOLIC BLOOD PRESSURE: 80 MMHG | WEIGHT: 193 LBS | TEMPERATURE: 97.4 F | SYSTOLIC BLOOD PRESSURE: 120 MMHG | HEIGHT: 67 IN | BODY MASS INDEX: 30.29 KG/M2

## 2021-07-28 DIAGNOSIS — R06.83 SNORING: ICD-10-CM

## 2021-07-28 PROCEDURE — 99214 OFFICE O/P EST MOD 30 MIN: CPT | Mod: 25

## 2021-07-28 PROCEDURE — 94727 GAS DIL/WSHOT DETER LNG VOL: CPT

## 2021-07-28 PROCEDURE — ZZZZZ: CPT

## 2021-07-28 PROCEDURE — 94010 BREATHING CAPACITY TEST: CPT

## 2021-07-28 PROCEDURE — 94729 DIFFUSING CAPACITY: CPT

## 2021-07-28 RX ORDER — OLOPATADINE HYDROCHLORIDE 665 UG/1
0.6 SPRAY, METERED NASAL
Qty: 1 | Refills: 3 | Status: ACTIVE | COMMUNITY
Start: 2021-07-28 | End: 1900-01-01

## 2021-07-28 NOTE — PROCEDURE
[FreeTextEntry1] : FENO was unable to complete due to insurance; a normal value being less than 25\par Fractional exhaled nitric oxide (FENO) is regarded as a simple, noninvasive method for assessing eosinophilic airway inflammation. Produced by a variety of cells within the lung, nitric oxide (NO) concentrations are generally low in healthy individuals. However, high concentrations of NO appear to be involved in nonspecific host defense mechanisms and chronic inflammatory diseases such as asthma. The American Thoracic Society (ATS) therefore has recommended using FENO to aid in the diagnosis and monitoring of eosinophilic airway inflammation and asthma, and for identifying steroid responsive individuals whose chronic respiratory symptoms may be caused by airway inflammation. \par \par Full PFT revealed normal flows, with a FEV1 of  4.22 L, which is  121 % of predicted, normal lung volumes, and a diffusion of 24.9  , which is  96 % of predicted, with a normal flow volume loop

## 2021-07-28 NOTE — HISTORY OF PRESENT ILLNESS
[TextBox_4] : Mr. GALLOWAY is a 50 year old male presenting to the office today for follow up pulmonary evaluation. His chief complaint is\par \par \par  -he notes energy levels fluctuate depending on previous night sleep\par -he notes sleep quality poor on week days\par -he notes energy levels 7/10 on average\par -he notes regular bowel movements \par -he notes intermittent HA onset following COVID 19 infection\par -he notes constant PND with residual sour, metallic taste in mouth \par -he notes weight stable\par -he denies taking any new medications, vitamins.\par -he notes heartburn triggered by certain foods, stable\par -he denies SOB\par -he denies orthopnea\par -he notes CLARA not treated\par \par \par \par -denies any chest pain, chest pressure, diarrhea, constipation, dysphagia, sour taste in the mouth, dizziness, leg swelling, leg pain, myalgias, arthralgias, itchy eyes, itchy ears.\par

## 2021-07-28 NOTE — ASSESSMENT
[FreeTextEntry1] : Mr. GALLOWAY is a 50 year old male with a history of   s/p occupational exposure in work place s/p 9/11, thrombocytopenia, GERD, CLARA  who comes into the office today for pulmonary evaluation for SOB, CLARA, abnormal CT, occupational exposure 9/11 in work place, s/p COVID-19 pneumonitis / respiratory failure (resolved) - [facility] rx need ([place]) - PND\par \par \par The patient's shortness of breath is multifactorial due to:\par -pulmonary disease \par     - s/p COVID-19 pneumonitis\par     - RADS\par     - ?pleuropericarditis \par -poor breathing mechanics \par -overweight/out of shape\par -?cardiac disease \par \par Problem 1:  s/p COVID-19 pneumonitis - resolved \par - Get blood work: D- Dimer, ferritin level (NC) \par -s/p COVID 19 Vaccine Moderna x 2\par -complete CT Chest 11/2021\par -educated patient on COVID 19 vaccine and appropriate recommendations \par  COVID-19 Immune Support Recommendations:\par -OTC Vitamin C 500mg BID \par -OTC Quercetin 250-500mg BID \par -OTC Zinc 75-100mg per day \par -OTC Melatonin 1 or 2 mg a night \par -OTC Vitamin D 1-4000mg per day \par -OTC Tonic Water 8oz per day \par -recommended  mg \par \par Problem 2: RADS (quiet) (Noncompliant) \par - continue Symbicort (160) 2 inhalations BID\par -continue Ventolin rescue inhaler 2 inhalations before exercise, Q6H \par - s/p full PFTs - wnl \par -Inhaler technique reviewed as well as oral hygiene techniques reviewed with patient. Avoidance of cold air, extremes of temperature, rescue inhaler should be used before exercise. Order of medication reviewed with patient. Recommended use of a cool mist humidifier in the bedroom.\par \par Problem 2A: PND\par -Add Olopatadine 0.6% at 1 sniff/nostril BID\par -recommended Xlear Nasal Saline \par Environmental measures for allergies were encouraged including mattress and pillow covers, air purifier, and environmental controls. \par \par Problem 3: ?Pleuropericarditis (resolved)\par - s/p blood work: CRP and ESR \par \par Problem 4:   Occupational exposure at workplace s/p 9/11: \par - risk of asthma c/w COVID-19 pneumonitis\par -regular pulmonary follow ups\par \par Problem 5:GERD\par -continue Pepcid 40 mg QHS\par -Rule of 2s: avoid eating too much, eating too fast, eating too late, eating too spicy, eating too lousy, eating two hours before bed.\par -Things to avoid including overeating, spicy foods, tight clothing, eating within three hours of bed, this list is not all inclusive. \par -For treatment of reflux, possible options discussed including diet control, H2 blockers, PPIs, as well as coating motility agents discussed as treatment options. Timing of meals and proximity of last meal to sleep were discussed. If symptoms persist, a formal gastrointestinal evaluation is needed.\par \par  Problem 6: CLARA (neck size: 17, memory / concentration, metabolism)\par - s/p home sleep study to re-evaluate - c/w moderate sleep apnea \par - get APAP study (refused) \par - recommended oral appliance (Jami Fan) \par Sleep apnea is associated with adverse clinical consequences which an affect most organ systems. Cardiovascular disease risk includes arrhythmias, atrial fibrillation, hypertension, coronary artery disease, and stroke. Metabolic disorders include diabetes type 2, non-alcoholic fatty liver disease. Mood disorder especially depression; and cognitive decline especially in the elderly. Associations with chronic reflux/Garcia’s esophagus some but not all inclusive. \par -Reasons include arousal consistent with hypopnea; respiratory events most prominent in REM sleep or supine position; therefore sleep staging and body position are important for accurate diagnosis and estimation of AHI.\par \par Problem 7: abnormal CT (3 mm nodule 3/2008) -C/w prior COVID \par - s/p follow up CT scan (next 11/2021) \par CAT scans are the only radiological modality to identify abnormalities w/in the lungs with regards to nodules/masses/lymph nodes. Risks, benefits were reviewed in detail. The guidelines for abnormalities include follow up CT scans at various intervals which could range from 6 weeks to 1 year intervals. If there is a change for the worse then consideration for a biopsy will be considered if you are a candidate. Second opinion evaluation with a thoracic surgeon or an interventional radiologist could be offered. \par \par Problem 8 : Poor Mechanics of Breathing\par - Proper breathing techniques were reviewed with an emphasis of exhalation. Patient instructed to breath in for 1 second and out for four seconds. Patient was encouraged to not talk while walking. \par \par Problem 9 : Out of shape \par -Weight loss, exercise, and diet control were discussed and are highly encouraged. Treatment options were given such as, aqua therapy, and contacting a nutritionist. Recommended to use the elliptical, stationary bike, less use of treadmill. Mindful eating was explained to the patient Obesity is associated with worsening asthma, shortness of breath, and potential for cardiac disease, diabetes, and other underlying medical conditions. \par \par Problem 10 : Cardiac\par -recommended to follow up with a cardiologist\par \par Problem 11: health maintenance\par -s/p influenza vaccine\par -recommended strep pneumonia vaccines after age 65: Prevnar-13 vaccine, followed by Pneumo vaccine 23 one year following\par -recommended early intervention for URIs\par -recommended regular osteoporosis evaluations\par -recommended early dermatological evaluations\par -recommended after the age of 50 to the age of 70, colonoscopy every 5 years \par \par  Follow up in 6-8 weeks\par -he  is recommended to call with any changes, questions, or concerns.\par

## 2021-07-28 NOTE — ADDENDUM
[FreeTextEntry1] : Documented by Brad Baca acting as a scribe for Dr. Sushant Samayoa on 07/28/2021.\par \par All medical record entries made by the Scribe were at my, Dr. Sushant Samayoa's, direction and personally dictated by me on 07/28/2021 . I have reviewed the chart and agree that the record accurately reflects my personal performance of the history, physical exam, assessment and plan. I have also personally directed, reviewed, and agree with the discharge instructions. \par

## 2021-11-30 ENCOUNTER — NON-APPOINTMENT (OUTPATIENT)
Age: 51
End: 2021-11-30

## 2021-11-30 ENCOUNTER — APPOINTMENT (OUTPATIENT)
Dept: PULMONOLOGY | Facility: CLINIC | Age: 51
End: 2021-11-30
Payer: COMMERCIAL

## 2021-11-30 VITALS
SYSTOLIC BLOOD PRESSURE: 120 MMHG | HEART RATE: 61 BPM | DIASTOLIC BLOOD PRESSURE: 70 MMHG | WEIGHT: 200 LBS | BODY MASS INDEX: 31.39 KG/M2 | OXYGEN SATURATION: 97 % | HEIGHT: 67 IN | RESPIRATION RATE: 16 BRPM | TEMPERATURE: 97.6 F

## 2021-11-30 PROCEDURE — 71046 X-RAY EXAM CHEST 2 VIEWS: CPT

## 2021-11-30 PROCEDURE — 99214 OFFICE O/P EST MOD 30 MIN: CPT | Mod: 25

## 2021-11-30 PROCEDURE — 94010 BREATHING CAPACITY TEST: CPT

## 2021-11-30 RX ORDER — BUDESONIDE AND FORMOTEROL FUMARATE DIHYDRATE 160; 4.5 UG/1; UG/1
160-4.5 AEROSOL RESPIRATORY (INHALATION) TWICE DAILY
Qty: 1 | Refills: 1 | Status: ACTIVE | COMMUNITY
Start: 2021-11-30 | End: 1900-01-01

## 2021-11-30 NOTE — ASSESSMENT
[FreeTextEntry1] : Mr. GALLOWAY is a 50 year old male with a history of   s/p occupational exposure in work place s/p 9/11, thrombocytopenia, GERD, CLARA  who comes into the office today for pulmonary evaluation for SOB, CLARA, abnormal CT, occupational exposure 9/11 in work place, s/p COVID-19 pneumonitis / respiratory failure (resolved) - [facility] rx need - now URI/asthmatic bronchitis\par \par \par The patient's shortness of breath is multifactorial due to:\par -pulmonary disease \par     - s/p COVID-19 pneumonitis\par     - RADS\par     - ?pleuropericarditis \par -poor breathing mechanics \par -overweight/out of shape\par -?cardiac disease \par \par Problem 1:  s/p COVID-19 pneumonitis - resolved \par - Get blood work: D- Dimer, ferritin level (NC) \par -s/p COVID 19 Vaccine Moderna x 2\par -complete CT Chest 11/2021\par -educated patient on COVID 19 vaccine and appropriate recommendations \par  COVID-19 Immune Support Recommendations:\par -OTC Vitamin C 500mg BID \par -OTC Quercetin 250-500mg BID \par -OTC Zinc 75-100mg per day \par -OTC Melatonin 1 or 2 mg a night \par -OTC Vitamin D 1-4000mg per day \par -OTC Tonic Water 8oz per day \par -recommended  mg \par \par Problem 2: RADS (active 11/2021)\par -Add Prednisone 20mg for 7 days then 10mg for 7 days\par -Information sheet given to the patient to be reviewed, this medication is never to be used without consulting the prescribing physician. Proper dietary restraint is necessary specifically salt containing foods, if any reaction may occur should be reported. \par - continue Symbicort (160) 2 inhalations BID\par -continue Ventolin rescue inhaler 2 inhalations before exercise, Q6H \par - s/p full PFTs - wnl \par -Inhaler technique reviewed as well as oral hygiene techniques reviewed with patient. Avoidance of cold air, extremes of temperature, rescue inhaler should be used before exercise. Order of medication reviewed with patient. Recommended use of a cool mist humidifier in the bedroom.\par \par Problem 2A: PND\par -Add Olopatadine 0.6% at 1 sniff/nostril BID\par -recommended Xlear Nasal Saline \par Environmental measures for allergies were encouraged including mattress and pillow covers, air purifier, and environmental controls. \par \par Problem 3: ?Pleuropericarditis (resolved)\par - s/p blood work: CRP and ESR \par \par Problem 4:   Occupational exposure at workplace s/p 9/11: \par - risk of asthma c/w COVID-19 pneumonitis\par -regular pulmonary follow ups\par \par Problem 5:GERD\par -continue Pepcid 40 mg QHS\par -Rule of 2s: avoid eating too much, eating too fast, eating too late, eating too spicy, eating too lousy, eating two hours before bed.\par -Things to avoid including overeating, spicy foods, tight clothing, eating within three hours of bed, this list is not all inclusive. \par -For treatment of reflux, possible options discussed including diet control, H2 blockers, PPIs, as well as coating motility agents discussed as treatment options. Timing of meals and proximity of last meal to sleep were discussed. If symptoms persist, a formal gastrointestinal evaluation is needed.\par \par  Problem 6: CLARA (neck size: 17, memory / concentration, metabolism)\par - s/p home sleep study to re-evaluate - c/w moderate sleep apnea \par - get APAP study (refused) \par - recommended oral appliance (Jami Fan) \par Sleep apnea is associated with adverse clinical consequences which an affect most organ systems. Cardiovascular disease risk includes arrhythmias, atrial fibrillation, hypertension, coronary artery disease, and stroke. Metabolic disorders include diabetes type 2, non-alcoholic fatty liver disease. Mood disorder especially depression; and cognitive decline especially in the elderly. Associations with chronic reflux/Garcia’s esophagus some but not all inclusive. \par -Reasons include arousal consistent with hypopnea; respiratory events most prominent in REM sleep or supine position; therefore sleep staging and body position are important for accurate diagnosis and estimation of AHI.\par \par Problem 7: abnormal CT (3 mm nodule 3/2008) -C/w prior COVID \par - s/p follow up CT scan (next 11/2021) \par CAT scans are the only radiological modality to identify abnormalities w/in the lungs with regards to nodules/masses/lymph nodes. Risks, benefits were reviewed in detail. The guidelines for abnormalities include follow up CT scans at various intervals which could range from 6 weeks to 1 year intervals. If there is a change for the worse then consideration for a biopsy will be considered if you are a candidate. Second opinion evaluation with a thoracic surgeon or an interventional radiologist could be offered. \par \par Problem 8 : Poor Mechanics of Breathing\par - Proper breathing techniques were reviewed with an emphasis of exhalation. Patient instructed to breath in for 1 second and out for four seconds. Patient was encouraged to not talk while walking. \par \par Problem 9 : Out of shape \par -Weight loss, exercise, and diet control were discussed and are highly encouraged. Treatment options were given such as, aqua therapy, and contacting a nutritionist. Recommended to use the elliptical, stationary bike, less use of treadmill. Mindful eating was explained to the patient Obesity is associated with worsening asthma, shortness of breath, and potential for cardiac disease, diabetes, and other underlying medical conditions. \par \par Problem 10 : Cardiac\par -recommended to follow up with a cardiologist\par \par Problem 11: health maintenance\par -denies influenza vaccine\par -recommended strep pneumonia vaccines after age 65: Prevnar-13 vaccine, followed by Pneumo vaccine 23 one year following\par -recommended early intervention for URIs\par -recommended regular osteoporosis evaluations\par -recommended early dermatological evaluations\par -recommended after the age of 50 to the age of 70, colonoscopy every 5 years \par \par  Follow up in 6-8 weeks\par -he  is recommended to call with any changes, questions, or concerns.\par

## 2021-11-30 NOTE — PHYSICAL EXAM
[No Acute Distress] : no acute distress [Normal Oropharynx] : normal oropharynx [III] : Mallampati Class: III [Normal Appearance] : normal appearance [No Neck Mass] : no neck mass [Normal Rate/Rhythm] : normal rate/rhythm [Normal S1, S2] : normal s1, s2 [No Murmurs] : no murmurs [No Resp Distress] : no resp distress [Clear to Auscultation Bilaterally] : clear to auscultation bilaterally [No Abnormalities] : no abnormalities [Benign] : benign [Normal Gait] : normal gait [No Clubbing] : no clubbing [No Cyanosis] : no cyanosis [No Edema] : no edema [FROM] : FROM [Normal Color/ Pigmentation] : normal color/ pigmentation [No Focal Deficits] : no focal deficits [Oriented x3] : oriented x3 [Normal Affect] : normal affect [TextBox_68] : I:E ratio 1:3; expiratory wheezes bilaterally

## 2021-11-30 NOTE — ADDENDUM
[FreeTextEntry1] : Documented by Clarence Bright acting as a scribe for Dr. Sushant Samayoa on 11/30/2021\par \par All medical record entries made by the scribe were at my, Dr. Sushant Samayoa's, direction and personally dictated by me on 11/30/2021. I have reviewed the chart and agree that the record accurately reflects my personal performance of the history, physical exam, assessment, and plan. I have also personally directed, reviewed, and agree with the discharge instructions.

## 2021-11-30 NOTE — HISTORY OF PRESENT ILLNESS
[TextBox_4] : Mr. GALLOWAY is a 50 year old male presenting to the office today for follow up pulmonary evaluation. His chief complaint is\par -he notes 3 weeks ago a cough started\par -he notes prior to that he was feeling fine\par -he notes his kids were sick before this and thinks he might have gotten it from them\par -he notes his bowels are regular\par -he notes his cough is intermittent \par -he notes the cough has been getting better but he is producing a lot of phlegm\par -he denies any visual issues \par -he notes he had a sore throat the first few weeks but this has resolved\par -he notes some hoarseness the first 2 days of being sick but this has improved\par -he notes his sinuses were leaky but now they are dry\par -he notes he still has a PND\par -he notes he never took Famotidine\par \par -patient denies any headaches, nausea, vomiting, fever, chills, sweats, chest pain, chest pressure, palpitations, coughing, wheezing, fatigue, diarrhea, constipation, dysphagia, myalgias, dizziness, leg swelling, leg pain, itchy eyes, itchy ears, heartburn, reflux or sour taste in the mouth

## 2021-11-30 NOTE — PROCEDURE
[FreeTextEntry1] : FENO was declined due to insurance; a normal value being less than 25. Fractional exhaled nitric oxide (FENO) is regarded as a simple, noninvasive method for assessing eosinophilic airway inflammation. Produced by a variety of cells within the lung, nitric oxide (NO) concentrations are generally low in healthy individuals. However, high concentrations of NO appear to be involved in nonspecific host defense mechanisms and chronic inflammatory  diseases such as asthma. The American Thoracic Society (ATS) therefore recommended using FENO to aid in the diagnosis and monitoring of eosinophilic airway inflammation and asthma, and for identifying steroid responsive individuals whose chronic respiratory symptoms may be caused by airway inflammation \par \par CXR revealed a normal sized heart; there was no evidence of infiltrate or effusion -- A normal appearing chest radiograph \par \par PFT revealed normal flows, with a FEV1 of 3.92L, which is 131% of predicted, with a normal flow volume loop \par \par -Images and procedures reviewed in detail and discussed with patient.

## 2022-03-30 ENCOUNTER — NON-APPOINTMENT (OUTPATIENT)
Age: 52
End: 2022-03-30

## 2022-03-30 ENCOUNTER — APPOINTMENT (OUTPATIENT)
Dept: PULMONOLOGY | Facility: CLINIC | Age: 52
End: 2022-03-30
Payer: COMMERCIAL

## 2022-03-30 VITALS
RESPIRATION RATE: 17 BRPM | WEIGHT: 202 LBS | HEIGHT: 67 IN | DIASTOLIC BLOOD PRESSURE: 78 MMHG | HEART RATE: 60 BPM | OXYGEN SATURATION: 98 % | BODY MASS INDEX: 31.71 KG/M2 | TEMPERATURE: 96.8 F | SYSTOLIC BLOOD PRESSURE: 118 MMHG

## 2022-03-30 PROCEDURE — 99214 OFFICE O/P EST MOD 30 MIN: CPT | Mod: 25

## 2022-03-30 PROCEDURE — 94010 BREATHING CAPACITY TEST: CPT

## 2022-03-30 RX ORDER — FAMOTIDINE 40 MG/1
40 TABLET, FILM COATED ORAL
Qty: 90 | Refills: 1 | Status: ACTIVE | COMMUNITY
Start: 2022-03-30 | End: 1900-01-01

## 2022-03-30 NOTE — PROCEDURE
[FreeTextEntry1] : PFT revealed normal flows, with a FEV1 of 3.99L, which is 134% of predicted, with a normal flow volume loop\par

## 2022-03-30 NOTE — REASON FOR VISIT
[Follow-Up] : a follow-up visit [TextBox_44] : abnormal CT, s/p COVID-19 pneumonitis 4/2020, CLARA, GERD, ?pleuropericarditis , RADS

## 2022-03-30 NOTE — ADDENDUM
[FreeTextEntry1] : Documented by Marlyn Fallon acting as a scribe for Dr. Sushant Samayoa on (03/30/2022).\par \par All medical record entries made by the Scribe were at my, Dr. Sushant Samayoa's, direction and personally dictated by me on (03/30/2022). I have reviewed the chart and agree that the record accurately reflects my personal performance of the history, physical exam, assessment and plan. I have also personally directed, reviewed, and agree with the discharge instructions.\par

## 2022-03-30 NOTE — HISTORY OF PRESENT ILLNESS
[TextBox_4] : Mr. GALLOWAY is a 51 year old male presenting to the office today for follow up pulmonary evaluation. His chief complaint is\par - he has been feeling about the same \par - he has no been having some issues with breathing \par - he notes he only gets about 4-5 hrs a night; he wakes up for work.\par - he has been having heart burn / reflux; more active than it has been in the past.\par - he notes his weight has been going up a little bit; he has not been eating differently \par - he feels he has not been exercising as much as he should\par - he had COVID back in april 2020 \par - he feels some residual effects from COVID \par - he notes sometimes his knee hurts and he has difficulty going up the stairs \par - he has not been using his inhalers \par - he has been having mucous since COVID \par - s/p COVID-19 vaccine x2 (Moderna) \par - he has not been taking his reflux medications \par - he notes he has not gotten his mouth piece for sleep apnea \par patient denies any headaches, nausea, vomiting, fever, chills, sweats, chest pain, chest pressure, palpitations, coughing, wheezing, fatigue, diarrhea, constipation, dysphagia, dizziness, leg swelling, itchy eyes, itchy ears, or sour taste in the mouth\par

## 2022-03-30 NOTE — ASSESSMENT
[FreeTextEntry1] : Mr. GALLOWAY is a 51 year old male with a history of   s/p occupational exposure in work place s/p 9/11, thrombocytopenia, GERD, CLARA  who comes into the office today for pulmonary evaluation for SOB, CLARA, abnormal CT, occupational exposure 9/11 in work place, s/p COVID-19 pneumonitis / respiratory failure 4/2020- [facility] rx need - still symptomatic / poor sleep \par \par \par The patient's shortness of breath is multifactorial due to:\par -pulmonary disease \par     - s/p COVID-19 pneumonitis\par     - RADS\par     - ?pleuropericarditis \par -poor breathing mechanics \par -overweight/out of shape\par -?cardiac disease \par \par Problem 1:  s/p COVID-19 pneumonitis - resolved \par - Get blood work: D- Dimer, ferritin level (NC) \par -s/p COVID 19 Vaccine Moderna x 2\par -complete CT Chest 11/2021- DUE\par -educated patient on COVID 19 vaccine and appropriate recommendations \par  COVID-19 Immune Support Recommendations:\par -OTC Vitamin C 500mg BID \par -OTC Quercetin 250-500mg BID \par -OTC Zinc 75-100mg per day \par -OTC Melatonin 1 or 2 mg a night \par -OTC Vitamin D 1-4000mg per day \par -OTC Tonic Water 8oz per day \par -recommended  mg \par \par Problem 2: RADS (active 11/2021) - NC with inhalers \par -Add Prednisone 20mg for 7 days then 10mg for 7 days\par -Information sheet given to the patient to be reviewed, this medication is never to be used without consulting the prescribing physician. Proper dietary restraint is necessary specifically salt containing foods, if any reaction may occur should be reported. \par - continue Symbicort (160) 2 inhalations BID (NC)\par -continue Ventolin rescue inhaler 2 inhalations before exercise, Q6H \par - s/p full PFTs - wnl \par -Inhaler technique reviewed as well as oral hygiene techniques reviewed with patient. Avoidance of cold air, extremes of temperature, rescue inhaler should be used before exercise. Order of medication reviewed with patient. Recommended use of a cool mist humidifier in the bedroom.\par \par Problem 2A: PND\par -Add Olopatadine 0.6% at 1 sniff/nostril BID\par -recommended Xlear Nasal Saline \par Environmental measures for allergies were encouraged including mattress and pillow covers, air purifier, and environmental controls. \par \par Problem 3: ?Pleuropericarditis (resolved)\par - s/p blood work: CRP and ESR \par \par Problem 4:   Occupational exposure at workplace s/p 9/11: \par - risk of asthma c/w COVID-19 pneumonitis\par -regular pulmonary follow ups\par \par Problem 5:GERD\par -continue Pepcid 40 mg QHS\par - recommended DGL \par -Rule of 2s: avoid eating too much, eating too fast, eating too late, eating too spicy, eating too lousy, eating two hours before bed.\par -Things to avoid including overeating, spicy foods, tight clothing, eating within three hours of bed, this list is not all inclusive. \par -For treatment of reflux, possible options discussed including diet control, H2 blockers, PPIs, as well as coating motility agents discussed as treatment options. Timing of meals and proximity of last meal to sleep were discussed. If symptoms persist, a formal gastrointestinal evaluation is needed.\par \par  Problem 6: CLARA (neck size: 17, memory / concentration, metabolism)\par - s/p home sleep study to re-evaluate - c/w moderate sleep apnea \par - get APAP study (refused) \par - recommended oral appliance (Jami Fan) \par Sleep apnea is associated with adverse clinical consequences which an affect most organ systems. Cardiovascular disease risk includes arrhythmias, atrial fibrillation, hypertension, coronary artery disease, and stroke. Metabolic disorders include diabetes type 2, non-alcoholic fatty liver disease. Mood disorder especially depression; and cognitive decline especially in the elderly. Associations with chronic reflux/Garcia’s esophagus some but not all inclusive. \par -Reasons include arousal consistent with hypopnea; respiratory events most prominent in REM sleep or supine position; therefore sleep staging and body position are important for accurate diagnosis and estimation of AHI.\par \par Problem 7: abnormal CT (3 mm nodule 3/2008) -C/w prior COVID - (NC)\par - s/p follow up CT scan (next 11/2021) \par CAT scans are the only radiological modality to identify abnormalities w/in the lungs with regards to nodules/masses/lymph nodes. Risks, benefits were reviewed in detail. The guidelines for abnormalities include follow up CT scans at various intervals which could range from 6 weeks to 1 year intervals. If there is a change for the worse then consideration for a biopsy will be considered if you are a candidate. Second opinion evaluation with a thoracic surgeon or an interventional radiologist could be offered. \par \par Problem 8 : Poor Mechanics of Breathing\par - Proper breathing techniques were reviewed with an emphasis of exhalation. Patient instructed to breath in for 1 second and out for four seconds. Patient was encouraged to not talk while walking. \par \par Problem 9 : Out of shape \par -Weight loss, exercise, and diet control were discussed and are highly encouraged. Treatment options were given such as, aqua therapy, and contacting a nutritionist. Recommended to use the elliptical, stationary bike, less use of treadmill. Mindful eating was explained to the patient Obesity is associated with worsening asthma, shortness of breath, and potential for cardiac disease, diabetes, and other underlying medical conditions. \par \par Problem 10 : Cardiac\par -recommended to follow up with a cardiologist\par \par Problem 11: health maintenance\par - recommended Copper Knee Brace and Topricin Cream \par -denies influenza vaccine\par -recommended strep pneumonia vaccines after age 65: Prevnar-13 vaccine, followed by Pneumo vaccine 23 one year following\par -recommended early intervention for URIs\par -recommended regular osteoporosis evaluations\par -recommended early dermatological evaluations\par -recommended after the age of 50 to the age of 70, colonoscopy every 5 years \par \par  Follow up in 6-8 weeks\par -he  is recommended to call with any changes, questions, or concerns.\par

## 2022-08-03 ENCOUNTER — NON-APPOINTMENT (OUTPATIENT)
Age: 52
End: 2022-08-03

## 2022-08-03 ENCOUNTER — APPOINTMENT (OUTPATIENT)
Dept: PULMONOLOGY | Facility: CLINIC | Age: 52
End: 2022-08-03

## 2022-08-03 VITALS
RESPIRATION RATE: 16 BRPM | HEIGHT: 67 IN | BODY MASS INDEX: 30.13 KG/M2 | HEART RATE: 63 BPM | DIASTOLIC BLOOD PRESSURE: 70 MMHG | SYSTOLIC BLOOD PRESSURE: 110 MMHG | TEMPERATURE: 97.1 F | OXYGEN SATURATION: 98 % | WEIGHT: 192 LBS

## 2022-08-03 DIAGNOSIS — Z57.9 OCCUPATIONAL EXPOSURE TO UNSPECIFIED RISK FACTOR: ICD-10-CM

## 2022-08-03 DIAGNOSIS — U07.1 COVID-19: ICD-10-CM

## 2022-08-03 DIAGNOSIS — J12.82 COVID-19: ICD-10-CM

## 2022-08-03 PROCEDURE — 94010 BREATHING CAPACITY TEST: CPT

## 2022-08-03 PROCEDURE — 99214 OFFICE O/P EST MOD 30 MIN: CPT | Mod: 25

## 2022-08-03 RX ORDER — SCOPOLAMINE 1.5 MG/1
1 PATCH, EXTENDED RELEASE TRANSDERMAL
Qty: 4 | Refills: 0 | Status: ACTIVE | COMMUNITY
Start: 2022-07-01

## 2022-08-03 SDOH — HEALTH STABILITY - PHYSICAL HEALTH: OCCUPATIONAL EXPOSURE TO UNSPECIFIED RISK FACTOR: Z57.9

## 2022-08-03 NOTE — ASSESSMENT
[FreeTextEntry1] : Mr. GALLOWAY is a 51 year old male with a history of   s/p occupational exposure in work place s/p 9/11, thrombocytopenia, GERD, CLARA  who comes into the office today for pulmonary evaluation for SOB, CLARA, abnormal CT, occupational exposure 9/11 in work place, s/p COVID-19 pneumonitis / respiratory failure 4/2020- [facility] rx need - still symptomatic / poor sleep \par \par \par The patient's shortness of breath is multifactorial due to:\par -pulmonary disease \par     - s/p COVID-19 pneumonitis\par     - RADS\par     - ?pleuropericarditis \par -poor breathing mechanics \par -overweight/out of shape\par -?cardiac disease \par \par Problem 1:  s/p COVID-19 pneumonitis - resolved \par - Get blood work: D- Dimer, ferritin level (NC) \par -s/p COVID 19 Vaccine Moderna x 2\par -complete CT Chest 11/2021- DUE\par -educated patient on COVID 19 vaccine and appropriate recommendations \par  COVID-19 Immune Support Recommendations:\par -OTC Vitamin C 500mg BID \par -OTC Quercetin 250-500mg BID \par -OTC Zinc 75-100mg per day \par -OTC Melatonin 1 or 2 mg a night \par -OTC Vitamin D 1-4000mg per day \par -OTC Tonic Water 8oz per day \par -recommended  mg \par \par \par Problem 1A: COVID 6/2022\par -s/p covid 19 vaccine x3 \par -Recommended not to get an additional COVID-19 booster at this time until it is updated against the current variants. If planning on travelling, obtaining another booster within 2 weeks of departure is recommended. \par \par Problem 2: RADS (active 11/2021) - NC with inhalers \par -s/p Prednisone 20mg for 7 days then 10mg for 7 days 3/2022\par -Information sheet given to the patient to be reviewed, this medication is never to be used without consulting the prescribing physician. Proper dietary restraint is necessary specifically salt containing foods, if any reaction may occur should be reported. \par - continue Symbicort (160) 2 inhalations BID (NC)\par -continue Ventolin rescue inhaler 2 inhalations before exercise, Q6H \par - s/p full PFTs - wnl \par -Inhaler technique reviewed as well as oral hygiene techniques reviewed with patient. Avoidance of cold air, extremes of temperature, rescue inhaler should be used before exercise. Order of medication reviewed with patient. Recommended use of a cool mist humidifier in the bedroom.\par \par Problem 2A: PND\par -Add Olopatadine 0.6% at 1 sniff/nostril BID\par -recommended Xlear Nasal Saline \par Environmental measures for allergies were encouraged including mattress and pillow covers, air purifier, and environmental controls. \par \par Problem 3: ?Pleuropericarditis (resolved)\par - s/p blood work: CRP and ESR \par \par Problem 4:   Occupational exposure at workplace s/p 9/11: \par - risk of asthma c/w COVID-19 pneumonitis\par -regular pulmonary follow ups\par \par Problem 5:GERD\par -pending EgD 8/2022\par -continue Pepcid 40 mg QHS\par - recommended DGL \par -Rule of 2s: avoid eating too much, eating too fast, eating too late, eating too spicy, eating too lousy, eating two hours before bed.\par -Things to avoid including overeating, spicy foods, tight clothing, eating within three hours of bed, this list is not all inclusive. \par -For treatment of reflux, possible options discussed including diet control, H2 blockers, PPIs, as well as coating motility agents discussed as treatment options. Timing of meals and proximity of last meal to sleep were discussed. If symptoms persist, a formal gastrointestinal evaluation is needed.\par \par  Problem 6: CLARA (neck size: 17, memory / concentration, metabolism)-pending\par - s/p home sleep study to re-evaluate - c/w moderate sleep apnea \par - get APAP study (refused) \par - recommended oral appliance (Jami Fan) \par Sleep apnea is associated with adverse clinical consequences which an affect most organ systems. Cardiovascular disease risk includes arrhythmias, atrial fibrillation, hypertension, coronary artery disease, and stroke. Metabolic disorders include diabetes type 2, non-alcoholic fatty liver disease. Mood disorder especially depression; and cognitive decline especially in the elderly. Associations with chronic reflux/Garcia’s esophagus some but not all inclusive. \par -Reasons include arousal consistent with hypopnea; respiratory events most prominent in REM sleep or supine position; therefore sleep staging and body position are important for accurate diagnosis and estimation of AHI.\par \par Problem 7: abnormal CT (3 mm nodule 3/2008) -C/w prior COVID - (NC)\par - s/p follow up CT scan (next 11/2021) \par -follow up inner imaging scan- if negative f/u 7/2023\par CAT scans are the only radiological modality to identify abnormalities w/in the lungs with regards to nodules/masses/lymph nodes. Risks, benefits were reviewed in detail. The guidelines for abnormalities include follow up CT scans at various intervals which could range from 6 weeks to 1 year intervals. If there is a change for the worse then consideration for a biopsy will be considered if you are a candidate. Second opinion evaluation with a thoracic surgeon or an interventional radiologist could be offered. \par \par Problem 8 : Poor Mechanics of Breathing\par - Proper breathing techniques were reviewed with an emphasis of exhalation. Patient instructed to breath in for 1 second and out for four seconds. Patient was encouraged to not talk while walking. \par \par Problem 9 : Out of shape \par -Weight loss, exercise, and diet control were discussed and are highly encouraged. Treatment options were given such as, aqua therapy, and contacting a nutritionist. Recommended to use the elliptical, stationary bike, less use of treadmill. Mindful eating was explained to the patient Obesity is associated with worsening asthma, shortness of breath, and potential for cardiac disease, diabetes, and other underlying medical conditions. \par \par Problem 10 : Cardiac\par -recommended to follow up with a cardiologist\par \par Problem 11: health maintenance\par - recommended Copper Knee Brace and Topricin Cream \par -denies influenza vaccine\par -recommended strep pneumonia vaccines after age 65: Prevnar-13 vaccine, followed by Pneumo vaccine 23 one year following\par -recommended early intervention for URIs\par -recommended regular osteoporosis evaluations\par -recommended early dermatological evaluations\par -recommended after the age of 50 to the age of 70, colonoscopy every 5 years \par \par  Follow up in 6-8 weeks\par -he  is recommended to call with any changes, questions, or concerns.\par

## 2022-08-03 NOTE — ADDENDUM
[FreeTextEntry1] : Documented by Yaw Bright acting as a scribe for Dr. Sushant Samayoa on 08/03/2022.\par \par All medical record entries made by the Scribe were at my, Dr. Sushant Samayoa's, direction and personally dictated by me on 08/03/2022. I have reviewed the chart and agree that the record accurately reflects my personal performance of the history, physical exam, assessment and plan. I have also personally directed, reviewed, and agree with the discharge instructions.

## 2022-08-03 NOTE — PROCEDURE
[FreeTextEntry1] : PFT revealed normal flows, with a FEV1 of 4.11L, which is 138% of predicted, with a normal flow volume loop\par

## 2022-08-03 NOTE — REASON FOR VISIT
[Follow-Up] : a follow-up visit [TextBox_44] : abnormal CT, s/p COVID-19 pneumonitis 4/2020, 6/2022, CLARA, GERD, ?pleuropericarditis , RADS

## 2022-08-03 NOTE — HISTORY OF PRESENT ILLNESS
[TextBox_4] : Mr. GALLOWAY is a 51 year old male presenting to the office today for follow up pulmonary evaluation. His chief complaint is\par \par -s/p second bout of COVID 6/2022\par -he notes active GERD that is controlled with diet\par -he notes his bowels are regular \par -he notes his weight is stable \par -he notes sleep is still a big issue\par -he notes getting 4  hours of sleep \par -no new medications, vitamins, or supplements \par -he notes exercising (biking 10 miles daily)\par -he notes pending sleep study and endoscopy\par \par -patient denies any headaches, nausea, vomiting, fever, chills, sweats, chest pain, chest pressure, palpitations, coughing, wheezing, fatigue, diarrhea, constipation, dysphagia, myalgias, dizziness, leg swelling, leg pain, itchy eyes, itchy ears, or sour taste in the mouth

## 2022-08-04 ENCOUNTER — NON-APPOINTMENT (OUTPATIENT)
Age: 52
End: 2022-08-04

## 2022-12-19 ENCOUNTER — APPOINTMENT (OUTPATIENT)
Dept: PULMONOLOGY | Facility: CLINIC | Age: 52
End: 2022-12-19

## 2022-12-19 VITALS
HEIGHT: 69 IN | HEART RATE: 64 BPM | RESPIRATION RATE: 16 BRPM | BODY MASS INDEX: 28.44 KG/M2 | DIASTOLIC BLOOD PRESSURE: 78 MMHG | WEIGHT: 192 LBS | TEMPERATURE: 97.8 F | SYSTOLIC BLOOD PRESSURE: 122 MMHG | OXYGEN SATURATION: 97 %

## 2022-12-19 DIAGNOSIS — G47.30 SLEEP APNEA, UNSPECIFIED: ICD-10-CM

## 2022-12-19 DIAGNOSIS — R93.89 ABNORMAL FINDINGS ON DIAGNOSTIC IMAGING OF OTHER SPECIFIED BODY STRUCTURES: ICD-10-CM

## 2022-12-19 DIAGNOSIS — G47.33 OBSTRUCTIVE SLEEP APNEA (ADULT) (PEDIATRIC): ICD-10-CM

## 2022-12-19 DIAGNOSIS — E66.3 OVERWEIGHT: ICD-10-CM

## 2022-12-19 DIAGNOSIS — K21.9 GASTRO-ESOPHAGEAL REFLUX DISEASE W/OUT ESOPHAGITIS: ICD-10-CM

## 2022-12-19 DIAGNOSIS — J45.909 UNSPECIFIED ASTHMA, UNCOMPLICATED: ICD-10-CM

## 2022-12-19 DIAGNOSIS — R09.82 POSTNASAL DRIP: ICD-10-CM

## 2022-12-19 DIAGNOSIS — U07.1 COVID-19: ICD-10-CM

## 2022-12-19 DIAGNOSIS — R06.02 SHORTNESS OF BREATH: ICD-10-CM

## 2022-12-19 PROCEDURE — 94010 BREATHING CAPACITY TEST: CPT

## 2022-12-19 PROCEDURE — 94727 GAS DIL/WSHOT DETER LNG VOL: CPT

## 2022-12-19 PROCEDURE — ZZZZZ: CPT

## 2022-12-19 PROCEDURE — 99214 OFFICE O/P EST MOD 30 MIN: CPT | Mod: 25

## 2022-12-19 PROCEDURE — 94729 DIFFUSING CAPACITY: CPT

## 2022-12-19 RX ORDER — PREDNISONE 10 MG/1
10 TABLET ORAL
Qty: 50 | Refills: 0 | Status: DISCONTINUED | COMMUNITY
Start: 2021-11-30 | End: 2022-12-19

## 2022-12-19 NOTE — ASSESSMENT
[FreeTextEntry1] : Mr. GALLOWAY is a 52 year old male with a history of   s/p occupational exposure in work place s/p 9/11, thrombocytopenia, GERD, CLARA  who comes into the office today for pulmonary evaluation for SOB, CLARA, abnormal CT, occupational exposure 9/11 in work place, s/p COVID-19 pneumonitis / respiratory failure 4/2020- [facility] rx need - still symptomatic / poor sleep (awaiting CPAP); untreated reflux/PND \par \par \par The patient's shortness of breath is multifactorial due to:\par -pulmonary disease \par     - s/p COVID-19 pneumonitis\par     - RADS\par     - ?pleuropericarditis \par -poor breathing mechanics \par -overweight/out of shape\par -?cardiac disease \par \par Problem 1:  s/p COVID-19 pneumonitis - resolved \par - Get blood work: D- Dimer, ferritin level (NC) \par -s/p COVID 19 Vaccine Moderna x 2\par -complete CT Chest 11/2021- DUE\par -educated patient on COVID 19 vaccine and appropriate recommendations \par  COVID-19 Immune Support Recommendations:\par -OTC Vitamin C 500mg BID \par -OTC Quercetin 250-500mg BID \par -OTC Zinc 75-100mg per day \par -OTC Melatonin 1 or 2 mg a night \par -OTC Vitamin D 1-4000mg per day \par -OTC Tonic Water 8oz per day \par -recommended  mg \par \par \par Problem 1A: COVID 6/2022\par -s/p covid 19 vaccine x3 \par -Recommended not to get an additional COVID-19 booster at this time until it is updated against the current variants. If planning on travelling, obtaining another booster within 2 weeks of departure is recommended. \par \par Problem 2: RADS (active 11/2021) - NC with inhalers \par -s/p Prednisone 20mg for 7 days then 10mg for 7 days 3/2022\par -Information sheet given to the patient to be reviewed, this medication is never to be used without consulting the prescribing physician. Proper dietary restraint is necessary specifically salt containing foods, if any reaction may occur should be reported. \par - continue Symbicort (160) 2 inhalations BID (NC)\par -continue Ventolin rescue inhaler 2 inhalations before exercise, Q6H \par - s/p full PFTs - wnl \par -Inhaler technique reviewed as well as oral hygiene techniques reviewed with patient. Avoidance of cold air, extremes of temperature, rescue inhaler should be used before exercise. Order of medication reviewed with patient. Recommended use of a cool mist humidifier in the bedroom.\par \par Problem 2A: PND (NC)\par -Add Olopatadine 0.6% at 1 sniff/nostril BID\par -recommended Xlear Nasal Saline \par Environmental measures for allergies were encouraged including mattress and pillow covers, air purifier, and environmental controls. \par \par Problem 3: ?Pleuropericarditis (resolved)\par - s/p blood work: CRP and ESR \par \par Problem 4:   Occupational exposure at workplace s/p 9/11: \par - risk of asthma c/w COVID-19 pneumonitis\par -regular pulmonary follow ups\par \par Problem 5:GERD (NC)\par -pending EgD 8/2022\par -continue Pepcid 40 mg QHS\par - recommended DGL \par -Rule of 2s: avoid eating too much, eating too fast, eating too late, eating too spicy, eating too lousy, eating two hours before bed.\par -Things to avoid including overeating, spicy foods, tight clothing, eating within three hours of bed, this list is not all inclusive. \par -For treatment of reflux, possible options discussed including diet control, H2 blockers, PPIs, as well as coating motility agents discussed as treatment options. Timing of meals and proximity of last meal to sleep were discussed. If symptoms persist, a formal gastrointestinal evaluation is needed.\par \par  Problem 6: CLARA (neck size: 17, memory / concentration, metabolism)-pending- (Certified) \par - s/p home sleep study to re-evaluate - c/w moderate sleep apnea \par - get APAP study (refused) \par - recommended oral appliance (Jami Fan) \par Sleep apnea is associated with adverse clinical consequences which an affect most organ systems. Cardiovascular disease risk includes arrhythmias, atrial fibrillation, hypertension, coronary artery disease, and stroke. Metabolic disorders include diabetes type 2, non-alcoholic fatty liver disease. Mood disorder especially depression; and cognitive decline especially in the elderly. Associations with chronic reflux/Garcia’s esophagus some but not all inclusive. \par -Reasons include arousal consistent with hypopnea; respiratory events most prominent in REM sleep or supine position; therefore sleep staging and body position are important for accurate diagnosis and estimation of AHI.\par \par Problem 7: abnormal CT (3 mm nodule 3/2008) -C/w prior COVID - (NC)\par - s/p follow up CT scan last 7/2022\par -follow up inner imaging scan- if negative f/u 7/2023\par CAT scans are the only radiological modality to identify abnormalities w/in the lungs with regards to nodules/masses/lymph nodes. Risks, benefits were reviewed in detail. The guidelines for abnormalities include follow up CT scans at various intervals which could range from 6 weeks to 1 year intervals. If there is a change for the worse then consideration for a biopsy will be considered if you are a candidate. Second opinion evaluation with a thoracic surgeon or an interventional radiologist could be offered. \par \par Problem 8 : Poor Mechanics of Breathing\par - Proper breathing techniques were reviewed with an emphasis of exhalation. Patient instructed to breath in for 1 second and out for four seconds. Patient was encouraged to not talk while walking. \par \par Problem 9 : Out of shape \par -Weight loss, exercise, and diet control were discussed and are highly encouraged. Treatment options were given such as, aqua therapy, and contacting a nutritionist. Recommended to use the elliptical, stationary bike, less use of treadmill. Mindful eating was explained to the patient Obesity is associated with worsening asthma, shortness of breath, and potential for cardiac disease, diabetes, and other underlying medical conditions. \par \par Problem 10 : Cardiac\par -recommended to follow up with a cardiologist- Ng- [facility]\par \par Problem 11: health maintenance\par - recommended Copper Knee Brace and Topricin Cream \par -denies influenza vaccine\par -recommended strep pneumonia vaccines after age 65: Prevnar-13 vaccine, followed by Pneumo vaccine 23 one year following\par -recommended early intervention for URIs\par -recommended regular osteoporosis evaluations\par -recommended early dermatological evaluations\par -recommended after the age of 50 to the age of 70, colonoscopy every 5 years \par \par  Follow up in 6-8 weeks\par -he  is recommended to call with any changes, questions, or concerns.\par

## 2022-12-19 NOTE — ADDENDUM
[FreeTextEntry1] : Documented by Alex Marie acting as a scribe for Dr. Sushant Samayoa on (12/19/2022).\par \par All medical record entries made by the Scribe were at my, Dr. Sushant Samayoa's, direction and personally dictated by me on (12/19/2022). I have reviewed the chart and agree that the record accurately reflects my personal performance of the history, physical exam, assessment and plan. I have personally directed, reviewed, and agree with the discharge instructions.

## 2022-12-19 NOTE — PROCEDURE
[FreeTextEntry1] : CT (07/27/2022) revealed no evidence of coronary artery calcification. No active pulmonary disease. fatty infiltration of liver. Cholelithiasis. Left renal calculus. No evidence of hydronephrosis. No abnormal mass, lymph node enlargement or abnormal fluid collection is noted within the abdomen or pelvis. \par \par Full PFT- spi reveals normal flows; FEV1 was 4.71L which is 132% of predicted; normal lung volumes; normal diffusion at 31.8, which is 124% of predicted; normal flow volume loop

## 2022-12-19 NOTE — HISTORY OF PRESENT ILLNESS
[TextBox_4] : Mr. GALLOWAY is a 52 year old male presenting to the office today for follow up pulmonary evaluation. His chief complaint is\par - he notes feeling well in general\par - he notes getting 4-5 hours of sleep\par - he notes needing more sleep\par - he notes energy level could be better\par - he notes bowels are regular \par - he notes sense of smell and taste are okay\par - he notes heartburn and reflux is active \par - he notes being certified for GERD and CLARA by [facility] \par - he notes occasional cough \par - he still notes SOB\par - he notes weight is stable\par - he notes exercising \par \par \par - He  denies any headaches, nausea, vomiting, fever, chills, sweats, chest pains, chest pressure, diarrhea, constipation, dysphagia, myalgia, dizziness, leg swelling, leg pain, itchy eyes, itchy ears, or sour taste in the mouth.

## 2022-12-19 NOTE — PHYSICAL EXAM
[No Acute Distress] : no acute distress [Normal Oropharynx] : normal oropharynx [Normal Appearance] : normal appearance [No Neck Mass] : no neck mass [Normal Rate/Rhythm] : normal rate/rhythm [Normal S1, S2] : normal s1, s2 [No Resp Distress] : no resp distress [No Murmurs] : no murmurs [Clear to Auscultation Bilaterally] : clear to auscultation bilaterally [No Abnormalities] : no abnormalities [Benign] : benign [Normal Gait] : normal gait [No Clubbing] : no clubbing [No Cyanosis] : no cyanosis [No Edema] : no edema [FROM] : FROM [Normal Color/ Pigmentation] : normal color/ pigmentation [No Focal Deficits] : no focal deficits [Oriented x3] : oriented x3 [Normal Affect] : normal affect [III] : Mallampati Class: III [TextBox_68] : I:E 1:3; Clear

## 2023-06-05 NOTE — ED ADULT NURSE NOTE - TEMPLATE
Patient alert on stretcher, in NAD. Mother and visitor at bedside. No needs. Patient states not able to urinate at this time.       Reinier Vance RN  06/05/23 [id number] General

## 2023-07-26 ENCOUNTER — APPOINTMENT (OUTPATIENT)
Dept: CT IMAGING | Facility: CLINIC | Age: 53
End: 2023-07-26
Payer: COMMERCIAL

## 2023-07-26 ENCOUNTER — OUTPATIENT (OUTPATIENT)
Dept: OUTPATIENT SERVICES | Facility: HOSPITAL | Age: 53
LOS: 1 days | End: 2023-07-26
Payer: COMMERCIAL

## 2023-07-26 DIAGNOSIS — R93.89 ABNORMAL FINDINGS ON DIAGNOSTIC IMAGING OF OTHER SPECIFIED BODY STRUCTURES: ICD-10-CM

## 2023-07-26 PROCEDURE — 71250 CT THORAX DX C-: CPT

## 2023-07-26 PROCEDURE — 71250 CT THORAX DX C-: CPT | Mod: 26

## 2024-10-03 RX ORDER — CEPHALEXIN 500 MG/1
500 CAPSULE ORAL
Qty: 56 | Refills: 0 | Status: ACTIVE | COMMUNITY
Start: 2024-10-03 | End: 1900-01-01

## 2024-12-05 ENCOUNTER — APPOINTMENT (OUTPATIENT)
Dept: ORTHOPEDIC SURGERY | Facility: CLINIC | Age: 54
End: 2024-12-05
Payer: OTHER MISCELLANEOUS

## 2024-12-05 DIAGNOSIS — S93.492A SPRAIN OF OTHER LIGAMENT OF LEFT ANKLE, INITIAL ENCOUNTER: ICD-10-CM

## 2024-12-05 DIAGNOSIS — S82.832S OTHER FRACTURE OF UPPER AND LOWER END OF LEFT FIBULA, SEQUELA: ICD-10-CM

## 2024-12-05 DIAGNOSIS — M25.372 OTHER INSTABILITY, LEFT ANKLE: ICD-10-CM

## 2024-12-05 PROCEDURE — 99204 OFFICE O/P NEW MOD 45 MIN: CPT

## 2025-02-04 ENCOUNTER — APPOINTMENT (OUTPATIENT)
Dept: ORTHOPEDIC SURGERY | Facility: CLINIC | Age: 55
End: 2025-02-04
Payer: OTHER MISCELLANEOUS

## 2025-02-04 VITALS — BODY MASS INDEX: 28.44 KG/M2 | HEIGHT: 69 IN | WEIGHT: 192 LBS

## 2025-02-04 DIAGNOSIS — M25.372 OTHER INSTABILITY, LEFT ANKLE: ICD-10-CM

## 2025-02-04 DIAGNOSIS — S82.832S OTHER FRACTURE OF UPPER AND LOWER END OF LEFT FIBULA, SEQUELA: ICD-10-CM

## 2025-02-04 PROCEDURE — 99213 OFFICE O/P EST LOW 20 MIN: CPT
